# Patient Record
Sex: MALE | Race: WHITE | Employment: FULL TIME | URBAN - NONMETROPOLITAN AREA
[De-identification: names, ages, dates, MRNs, and addresses within clinical notes are randomized per-mention and may not be internally consistent; named-entity substitution may affect disease eponyms.]

---

## 2019-07-08 ENCOUNTER — HOSPITAL ENCOUNTER (OUTPATIENT)
Age: 54
Discharge: HOME | End: 2019-07-08
Payer: COMMERCIAL

## 2019-07-08 DIAGNOSIS — Z11.4 SCREENING FOR HIV (HUMAN IMMUNODEFICIENCY VIRUS): ICD-10-CM

## 2019-07-08 DIAGNOSIS — R79.89 LOW TESTOSTERONE: ICD-10-CM

## 2019-07-08 DIAGNOSIS — E11.9 CONTROLLED TYPE 2 DIABETES MELLITUS WITHOUT COMPLICATION, WITHOUT LONG-TERM CURRENT USE OF INSULIN (HCC): ICD-10-CM

## 2019-07-08 DIAGNOSIS — E55.9 VITAMIN D DEFICIENCY: ICD-10-CM

## 2019-07-08 DIAGNOSIS — Z12.5 SCREENING PSA (PROSTATE SPECIFIC ANTIGEN): ICD-10-CM

## 2019-07-08 DIAGNOSIS — E78.2 MIXED HYPERLIPIDEMIA: ICD-10-CM

## 2019-07-08 LAB
ABSOLUTE EOS #: 0.08 K/UL (ref 0–0.44)
ABSOLUTE IMMATURE GRANULOCYTE: 0.03 K/UL (ref 0–0.3)
ABSOLUTE LYMPH #: 1.62 K/UL (ref 1.1–3.7)
ABSOLUTE MONO #: 0.41 K/UL (ref 0.1–1.2)
ALBUMIN SERPL-MCNC: 4.8 G/DL (ref 3.5–5.2)
ALBUMIN/GLOBULIN RATIO: 1.5 (ref 1–2.5)
ALP BLD-CCNC: 63 U/L (ref 40–129)
ALT SERPL-CCNC: 58 U/L (ref 5–41)
ANION GAP SERPL CALCULATED.3IONS-SCNC: 13 MMOL/L (ref 9–17)
AST SERPL-CCNC: 35 U/L
BASOPHILS # BLD: 1 % (ref 0–2)
BASOPHILS ABSOLUTE: 0.05 K/UL (ref 0–0.2)
BILIRUB SERPL-MCNC: 0.42 MG/DL (ref 0.3–1.2)
BUN BLDV-MCNC: 21 MG/DL (ref 6–20)
BUN/CREAT BLD: 20 (ref 9–20)
CALCIUM SERPL-MCNC: 9.9 MG/DL (ref 8.6–10.4)
CHLORIDE BLD-SCNC: 103 MMOL/L (ref 98–107)
CHOLESTEROL/HDL RATIO: 6.8
CHOLESTEROL: 226 MG/DL
CO2: 25 MMOL/L (ref 20–31)
CREAT SERPL-MCNC: 1.06 MG/DL (ref 0.7–1.2)
CREATININE URINE: 130.2 MG/DL (ref 39–259)
DIFFERENTIAL TYPE: ABNORMAL
EOSINOPHILS RELATIVE PERCENT: 1 % (ref 1–4)
ESTIMATED AVERAGE GLUCOSE: 117 MG/DL
GFR AFRICAN AMERICAN: >60 ML/MIN
GFR NON-AFRICAN AMERICAN: >60 ML/MIN
GFR SERPL CREATININE-BSD FRML MDRD: ABNORMAL ML/MIN/{1.73_M2}
GFR SERPL CREATININE-BSD FRML MDRD: ABNORMAL ML/MIN/{1.73_M2}
GLUCOSE BLD-MCNC: 131 MG/DL (ref 70–99)
HBA1C MFR BLD: 5.7 % (ref 4.8–5.9)
HCT VFR BLD CALC: 45.5 % (ref 40.7–50.3)
HDLC SERPL-MCNC: 33 MG/DL
HEMOGLOBIN: 14.7 G/DL (ref 13–17)
HIV AG/AB: NONREACTIVE
IMMATURE GRANULOCYTES: 1 %
LDL CHOLESTEROL: 159 MG/DL (ref 0–130)
LYMPHOCYTES # BLD: 28 % (ref 24–43)
MCH RBC QN AUTO: 28.8 PG (ref 25.2–33.5)
MCHC RBC AUTO-ENTMCNC: 32.3 G/DL (ref 28.4–34.8)
MCV RBC AUTO: 89 FL (ref 82.6–102.9)
MICROALBUMIN/CREAT 24H UR: 36 MG/L
MICROALBUMIN/CREAT UR-RTO: 28 MCG/MG CREAT
MONOCYTES # BLD: 7 % (ref 3–12)
NRBC AUTOMATED: 0 PER 100 WBC
PDW BLD-RTO: 13.2 % (ref 11.8–14.4)
PLATELET # BLD: 182 K/UL (ref 138–453)
PLATELET ESTIMATE: ABNORMAL
PMV BLD AUTO: 10.7 FL (ref 8.1–13.5)
POTASSIUM SERPL-SCNC: 4.6 MMOL/L (ref 3.7–5.3)
PROSTATE SPECIFIC ANTIGEN: 7.28 UG/L
RBC # BLD: 5.11 M/UL (ref 4.21–5.77)
RBC # BLD: ABNORMAL 10*6/UL
SEG NEUTROPHILS: 62 % (ref 36–65)
SEGMENTED NEUTROPHILS ABSOLUTE COUNT: 3.63 K/UL (ref 1.5–8.1)
SEX HORMONE BINDING GLOBULIN: 30 NMOL/L (ref 11–80)
SODIUM BLD-SCNC: 141 MMOL/L (ref 135–144)
TESTOSTERONE FREE-NONMALE: 61.2 PG/ML (ref 47–244)
TESTOSTERONE TOTAL: 295 NG/DL (ref 220–1000)
TOTAL PROTEIN: 7.9 G/DL (ref 6.4–8.3)
TRIGL SERPL-MCNC: 168 MG/DL
VITAMIN D 25-HYDROXY: 56.2 NG/ML (ref 30–100)
VLDLC SERPL CALC-MCNC: ABNORMAL MG/DL (ref 1–30)
WBC # BLD: 5.8 K/UL (ref 3.5–11.3)
WBC # BLD: ABNORMAL 10*3/UL

## 2019-07-08 PROCEDURE — 80053 COMPREHEN METABOLIC PANEL: CPT

## 2019-07-08 PROCEDURE — 83036 HEMOGLOBIN GLYCOSYLATED A1C: CPT

## 2019-07-08 PROCEDURE — 87389 HIV-1 AG W/HIV-1&-2 AB AG IA: CPT

## 2019-07-08 PROCEDURE — 84270 ASSAY OF SEX HORMONE GLOBUL: CPT

## 2019-07-08 PROCEDURE — 85025 COMPLETE CBC W/AUTO DIFF WBC: CPT

## 2019-07-08 PROCEDURE — 84403 ASSAY OF TOTAL TESTOSTERONE: CPT

## 2019-07-08 PROCEDURE — 80061 LIPID PANEL: CPT

## 2019-07-08 PROCEDURE — 82043 UR ALBUMIN QUANTITATIVE: CPT

## 2019-07-08 PROCEDURE — 36415 COLL VENOUS BLD VENIPUNCTURE: CPT

## 2019-07-08 PROCEDURE — 82306 VITAMIN D 25 HYDROXY: CPT

## 2019-07-08 PROCEDURE — 82570 ASSAY OF URINE CREATININE: CPT

## 2019-07-08 PROCEDURE — G0103 PSA SCREENING: HCPCS

## 2019-07-15 PROBLEM — E11.9 DIABETES (HCC): Chronic | Status: ACTIVE | Noted: 2018-01-01

## 2019-07-15 PROBLEM — E11.9 DIABETES (HCC): Status: ACTIVE | Noted: 2018-01-01

## 2019-07-17 PROBLEM — Z12.11 COLON CANCER SCREENING: Status: ACTIVE | Noted: 2019-07-17

## 2019-08-16 PROBLEM — Z12.11 COLON CANCER SCREENING: Status: RESOLVED | Noted: 2019-07-17 | Resolved: 2019-08-16

## 2019-09-04 ENCOUNTER — ANESTHESIA (OUTPATIENT)
Dept: OPERATING ROOM | Age: 54
End: 2019-09-04
Payer: COMMERCIAL

## 2019-09-04 ENCOUNTER — HOSPITAL ENCOUNTER (OUTPATIENT)
Age: 54
Setting detail: OUTPATIENT SURGERY
Discharge: HOME | End: 2019-09-04
Attending: INTERNAL MEDICINE | Admitting: INTERNAL MEDICINE
Payer: COMMERCIAL

## 2019-09-04 ENCOUNTER — ANESTHESIA EVENT (OUTPATIENT)
Dept: OPERATING ROOM | Age: 54
End: 2019-09-04
Payer: COMMERCIAL

## 2019-09-04 VITALS
WEIGHT: 182 LBS | RESPIRATION RATE: 18 BRPM | DIASTOLIC BLOOD PRESSURE: 88 MMHG | SYSTOLIC BLOOD PRESSURE: 123 MMHG | BODY MASS INDEX: 31.07 KG/M2 | HEART RATE: 71 BPM | TEMPERATURE: 97.2 F | HEIGHT: 64 IN | OXYGEN SATURATION: 97 %

## 2019-09-04 VITALS
RESPIRATION RATE: 15 BRPM | DIASTOLIC BLOOD PRESSURE: 77 MMHG | SYSTOLIC BLOOD PRESSURE: 120 MMHG | OXYGEN SATURATION: 97 %

## 2019-09-04 PROCEDURE — 3700000000 HC ANESTHESIA ATTENDED CARE: Performed by: INTERNAL MEDICINE

## 2019-09-04 PROCEDURE — 2500000003 HC RX 250 WO HCPCS: Performed by: NURSE ANESTHETIST, CERTIFIED REGISTERED

## 2019-09-04 PROCEDURE — 3700000001 HC ADD 15 MINUTES (ANESTHESIA): Performed by: INTERNAL MEDICINE

## 2019-09-04 PROCEDURE — 6360000002 HC RX W HCPCS: Performed by: NURSE ANESTHETIST, CERTIFIED REGISTERED

## 2019-09-04 PROCEDURE — 3609027000 HC COLONOSCOPY: Performed by: INTERNAL MEDICINE

## 2019-09-04 PROCEDURE — 2709999900 HC NON-CHARGEABLE SUPPLY: Performed by: INTERNAL MEDICINE

## 2019-09-04 PROCEDURE — 7100000011 HC PHASE II RECOVERY - ADDTL 15 MIN: Performed by: INTERNAL MEDICINE

## 2019-09-04 PROCEDURE — 7100000010 HC PHASE II RECOVERY - FIRST 15 MIN: Performed by: INTERNAL MEDICINE

## 2019-09-04 PROCEDURE — 2580000003 HC RX 258: Performed by: INTERNAL MEDICINE

## 2019-09-04 PROCEDURE — 45378 DIAGNOSTIC COLONOSCOPY: CPT | Performed by: INTERNAL MEDICINE

## 2019-09-04 RX ORDER — SODIUM CHLORIDE, SODIUM LACTATE, POTASSIUM CHLORIDE, CALCIUM CHLORIDE 600; 310; 30; 20 MG/100ML; MG/100ML; MG/100ML; MG/100ML
INJECTION, SOLUTION INTRAVENOUS CONTINUOUS
Status: DISCONTINUED | OUTPATIENT
Start: 2019-09-04 | End: 2019-09-04 | Stop reason: HOSPADM

## 2019-09-04 RX ORDER — PROPOFOL 10 MG/ML
INJECTION, EMULSION INTRAVENOUS CONTINUOUS PRN
Status: DISCONTINUED | OUTPATIENT
Start: 2019-09-04 | End: 2019-09-04 | Stop reason: SDUPTHER

## 2019-09-04 RX ORDER — LIDOCAINE HYDROCHLORIDE 20 MG/ML
INJECTION, SOLUTION EPIDURAL; INFILTRATION; INTRACAUDAL; PERINEURAL PRN
Status: DISCONTINUED | OUTPATIENT
Start: 2019-09-04 | End: 2019-09-04 | Stop reason: SDUPTHER

## 2019-09-04 RX ORDER — PROPOFOL 10 MG/ML
INJECTION, EMULSION INTRAVENOUS PRN
Status: DISCONTINUED | OUTPATIENT
Start: 2019-09-04 | End: 2019-09-04 | Stop reason: SDUPTHER

## 2019-09-04 RX ADMIN — PROPOFOL 50 MG: 10 INJECTION, EMULSION INTRAVENOUS at 09:52

## 2019-09-04 RX ADMIN — SODIUM CHLORIDE, POTASSIUM CHLORIDE, SODIUM LACTATE AND CALCIUM CHLORIDE: 600; 310; 30; 20 INJECTION, SOLUTION INTRAVENOUS at 10:01

## 2019-09-04 RX ADMIN — LIDOCAINE HYDROCHLORIDE 50 MG: 20 INJECTION, SOLUTION EPIDURAL; INFILTRATION; INTRACAUDAL at 09:52

## 2019-09-04 RX ADMIN — SODIUM CHLORIDE, POTASSIUM CHLORIDE, SODIUM LACTATE AND CALCIUM CHLORIDE: 600; 310; 30; 20 INJECTION, SOLUTION INTRAVENOUS at 09:12

## 2019-09-04 RX ADMIN — PROPOFOL 200 MCG/KG/MIN: 10 INJECTION, EMULSION INTRAVENOUS at 09:52

## 2019-09-04 ASSESSMENT — PAIN SCALES - GENERAL
PAINLEVEL_OUTOF10: 0

## 2019-09-04 NOTE — PROGRESS NOTES
Discharge Criteria    Inpatients must meet Criteria 1 through 7. All other patients are either YES or N/A. If a NO is chosen then Anesthesia or Surgeon must be notified. 1.  Minimum 30 minutes after last dose of sedative medication, minimum 120 minutes after last dose of reversal agent. Yes      2. Systolic BP stable within 20 mmHg for 30 minutes & systolic BP between 90 & 959 or within 10 mmHg of baseline. Yes      3. Pulse between 60 and 100 or within 10 bpm of baseline. Yes      4. Spontaneous respiratory rate >/= 10 per minute. Yes      5. SaO2 >/= 95 or  >/= baseline. Yes      6. Able to cough and swallow or return to baseline function. Yes      7. Alert and oriented or return to baseline mental status. Yes      8. Demonstrates controlled, coordinated movements, ambulates with steady gait, or return to baseline activity function. Yes      9. Minimal or no pain or nausea, or at a level tolerable and acceptable to patient. Yes      10. Takes and retains oral fluids as allowed. Yes      11. Procedural / perioperative site stable. Minimal or no bleeding. Yes          12. If GI endoscopy procedure, minimal or no abdominal distention or passing flatus. Yes      13. Written discharge instructions and emergency telephone number provided. Yes      14. Accompanied by a responsible adult. Yes      Adult patient discharged from facility without responsible person meets above criteria plus the following:   a) remains awake without stimulus for 30 minutes     b) oriented appropriate for age      c) all vital signs stable   d) no significant risk of losing protective reflexes      e) able to maintain pre-procedure mobility without assistance   f) no nausea or dizziness      g) transportation arrangements that do not require patient to operate motor Vehicle.      N/A

## 2019-09-29 PROBLEM — Z12.11 COLON CANCER SCREENING: Status: RESOLVED | Noted: 2019-07-17 | Resolved: 2019-09-29

## 2020-02-18 ENCOUNTER — HOSPITAL ENCOUNTER (OUTPATIENT)
Age: 55
Discharge: HOME | End: 2020-02-18
Payer: COMMERCIAL

## 2020-02-18 LAB
ALT SERPL-CCNC: 74 U/L (ref 5–41)
ANION GAP SERPL CALCULATED.3IONS-SCNC: 13 MMOL/L (ref 9–17)
AST SERPL-CCNC: 41 U/L
BUN BLDV-MCNC: 15 MG/DL (ref 6–20)
BUN/CREAT BLD: 17 (ref 9–20)
CALCIUM SERPL-MCNC: 9.6 MG/DL (ref 8.6–10.4)
CHLORIDE BLD-SCNC: 99 MMOL/L (ref 98–107)
CHOLESTEROL/HDL RATIO: 8
CHOLESTEROL: 239 MG/DL
CO2: 26 MMOL/L (ref 20–31)
CREAT SERPL-MCNC: 0.89 MG/DL (ref 0.7–1.2)
CREATININE URINE: 111.9 MG/DL (ref 39–259)
ESTIMATED AVERAGE GLUCOSE: 117 MG/DL
GFR AFRICAN AMERICAN: >60 ML/MIN
GFR NON-AFRICAN AMERICAN: >60 ML/MIN
GFR SERPL CREATININE-BSD FRML MDRD: ABNORMAL ML/MIN/{1.73_M2}
GFR SERPL CREATININE-BSD FRML MDRD: ABNORMAL ML/MIN/{1.73_M2}
GLUCOSE BLD-MCNC: 135 MG/DL (ref 70–99)
HBA1C MFR BLD: 5.7 % (ref 4.8–5.9)
HDLC SERPL-MCNC: 30 MG/DL
LDL CHOLESTEROL DIRECT: 100 MG/DL
LDL CHOLESTEROL: ABNORMAL MG/DL (ref 0–130)
MICROALBUMIN/CREAT 24H UR: <12 MG/L
MICROALBUMIN/CREAT UR-RTO: NORMAL MCG/MG CREAT
POTASSIUM SERPL-SCNC: 4.2 MMOL/L (ref 3.7–5.3)
SODIUM BLD-SCNC: 138 MMOL/L (ref 135–144)
TRIGL SERPL-MCNC: 754 MG/DL
VLDLC SERPL CALC-MCNC: ABNORMAL MG/DL (ref 1–30)

## 2020-02-18 PROCEDURE — 82043 UR ALBUMIN QUANTITATIVE: CPT

## 2020-02-18 PROCEDURE — 82570 ASSAY OF URINE CREATININE: CPT

## 2020-02-18 PROCEDURE — 84450 TRANSFERASE (AST) (SGOT): CPT

## 2020-02-18 PROCEDURE — 83036 HEMOGLOBIN GLYCOSYLATED A1C: CPT

## 2020-02-18 PROCEDURE — 36415 COLL VENOUS BLD VENIPUNCTURE: CPT

## 2020-02-18 PROCEDURE — 83721 ASSAY OF BLOOD LIPOPROTEIN: CPT

## 2020-02-18 PROCEDURE — 80048 BASIC METABOLIC PNL TOTAL CA: CPT

## 2020-02-18 PROCEDURE — 84460 ALANINE AMINO (ALT) (SGPT): CPT

## 2020-02-18 PROCEDURE — 80061 LIPID PANEL: CPT

## 2021-03-14 ENCOUNTER — HOSPITAL ENCOUNTER (EMERGENCY)
Age: 56
Discharge: HOME | DRG: 177 | End: 2021-03-14
Attending: EMERGENCY MEDICINE
Payer: COMMERCIAL

## 2021-03-14 ENCOUNTER — APPOINTMENT (OUTPATIENT)
Dept: GENERAL RADIOLOGY | Age: 56
DRG: 177 | End: 2021-03-14
Payer: COMMERCIAL

## 2021-03-14 VITALS
BODY MASS INDEX: 32.61 KG/M2 | SYSTOLIC BLOOD PRESSURE: 130 MMHG | HEART RATE: 104 BPM | RESPIRATION RATE: 16 BRPM | WEIGHT: 190 LBS | DIASTOLIC BLOOD PRESSURE: 82 MMHG | OXYGEN SATURATION: 93 % | TEMPERATURE: 96.5 F

## 2021-03-14 DIAGNOSIS — J18.9 PNEUMONIA DUE TO ORGANISM: Primary | ICD-10-CM

## 2021-03-14 LAB
ABSOLUTE EOS #: <0.03 K/UL (ref 0–0.44)
ABSOLUTE IMMATURE GRANULOCYTE: <0.03 K/UL (ref 0–0.3)
ABSOLUTE LYMPH #: 0.94 K/UL (ref 1.1–3.7)
ABSOLUTE MONO #: 0.42 K/UL (ref 0.1–1.2)
ANION GAP SERPL CALCULATED.3IONS-SCNC: 13 MMOL/L (ref 9–17)
BASOPHILS # BLD: 0 % (ref 0–2)
BASOPHILS ABSOLUTE: <0.03 K/UL (ref 0–0.2)
BNP INTERPRETATION: NORMAL
BUN BLDV-MCNC: 21 MG/DL (ref 6–20)
BUN/CREAT BLD: 21 (ref 9–20)
CALCIUM SERPL-MCNC: 9.1 MG/DL (ref 8.6–10.4)
CHLORIDE BLD-SCNC: 98 MMOL/L (ref 98–107)
CO2: 25 MMOL/L (ref 20–31)
CREAT SERPL-MCNC: 1.01 MG/DL (ref 0.7–1.2)
DIFFERENTIAL TYPE: ABNORMAL
EOSINOPHILS RELATIVE PERCENT: 0 % (ref 1–4)
GFR AFRICAN AMERICAN: >60 ML/MIN
GFR NON-AFRICAN AMERICAN: >60 ML/MIN
GFR SERPL CREATININE-BSD FRML MDRD: ABNORMAL ML/MIN/{1.73_M2}
GFR SERPL CREATININE-BSD FRML MDRD: ABNORMAL ML/MIN/{1.73_M2}
GLUCOSE BLD-MCNC: 174 MG/DL (ref 70–99)
HCT VFR BLD CALC: 43.8 % (ref 40.7–50.3)
HEMOGLOBIN: 14.4 G/DL (ref 13–17)
IMMATURE GRANULOCYTES: 0 %
LYMPHOCYTES # BLD: 19 % (ref 24–43)
MAGNESIUM: 2 MG/DL (ref 1.6–2.6)
MCH RBC QN AUTO: 27.7 PG (ref 25.2–33.5)
MCHC RBC AUTO-ENTMCNC: 32.9 G/DL (ref 28.4–34.8)
MCV RBC AUTO: 84.4 FL (ref 82.6–102.9)
MONOCYTES # BLD: 8 % (ref 3–12)
NRBC AUTOMATED: 0 PER 100 WBC
PDW BLD-RTO: 13.2 % (ref 11.8–14.4)
PLATELET # BLD: ABNORMAL K/UL (ref 138–453)
PLATELET ESTIMATE: ABNORMAL
PLATELET, FLUORESCENCE: 105 K/UL (ref 138–453)
PLATELET, IMMATURE FRACTION: 3.7 % (ref 1.1–10.3)
PMV BLD AUTO: ABNORMAL FL (ref 8.1–13.5)
POTASSIUM SERPL-SCNC: 4.1 MMOL/L (ref 3.7–5.3)
PRO-BNP: <20 PG/ML
RBC # BLD: 5.19 M/UL (ref 4.21–5.77)
RBC # BLD: ABNORMAL 10*6/UL
SEG NEUTROPHILS: 73 % (ref 36–65)
SEGMENTED NEUTROPHILS ABSOLUTE COUNT: 3.7 K/UL (ref 1.5–8.1)
SODIUM BLD-SCNC: 136 MMOL/L (ref 135–144)
TROPONIN INTERP: NORMAL
TROPONIN T: NORMAL NG/ML
TROPONIN, HIGH SENSITIVITY: 6 NG/L (ref 0–22)
WBC # BLD: 5.1 K/UL (ref 3.5–11.3)
WBC # BLD: ABNORMAL 10*3/UL

## 2021-03-14 PROCEDURE — 83735 ASSAY OF MAGNESIUM: CPT

## 2021-03-14 PROCEDURE — 6360000002 HC RX W HCPCS: Performed by: EMERGENCY MEDICINE

## 2021-03-14 PROCEDURE — 84484 ASSAY OF TROPONIN QUANT: CPT

## 2021-03-14 PROCEDURE — 80048 BASIC METABOLIC PNL TOTAL CA: CPT

## 2021-03-14 PROCEDURE — 83880 ASSAY OF NATRIURETIC PEPTIDE: CPT

## 2021-03-14 PROCEDURE — 96374 THER/PROPH/DIAG INJ IV PUSH: CPT

## 2021-03-14 PROCEDURE — 85055 RETICULATED PLATELET ASSAY: CPT

## 2021-03-14 PROCEDURE — 36415 COLL VENOUS BLD VENIPUNCTURE: CPT

## 2021-03-14 PROCEDURE — 71046 X-RAY EXAM CHEST 2 VIEWS: CPT

## 2021-03-14 PROCEDURE — 96375 TX/PRO/DX INJ NEW DRUG ADDON: CPT

## 2021-03-14 PROCEDURE — 6370000000 HC RX 637 (ALT 250 FOR IP): Performed by: EMERGENCY MEDICINE

## 2021-03-14 PROCEDURE — 99283 EMERGENCY DEPT VISIT LOW MDM: CPT

## 2021-03-14 PROCEDURE — 2580000003 HC RX 258: Performed by: EMERGENCY MEDICINE

## 2021-03-14 PROCEDURE — 85025 COMPLETE CBC W/AUTO DIFF WBC: CPT

## 2021-03-14 PROCEDURE — 93005 ELECTROCARDIOGRAM TRACING: CPT | Performed by: EMERGENCY MEDICINE

## 2021-03-14 RX ORDER — DEXAMETHASONE SODIUM PHOSPHATE 10 MG/ML
10 INJECTION INTRAMUSCULAR; INTRAVENOUS ONCE
Status: COMPLETED | OUTPATIENT
Start: 2021-03-14 | End: 2021-03-14

## 2021-03-14 RX ORDER — DOXYCYCLINE HYCLATE 100 MG/1
100 CAPSULE ORAL ONCE
Status: COMPLETED | OUTPATIENT
Start: 2021-03-14 | End: 2021-03-14

## 2021-03-14 RX ORDER — 0.9 % SODIUM CHLORIDE 0.9 %
1000 INTRAVENOUS SOLUTION INTRAVENOUS ONCE
Status: COMPLETED | OUTPATIENT
Start: 2021-03-14 | End: 2021-03-14

## 2021-03-14 RX ORDER — DOXYCYCLINE HYCLATE 100 MG
100 TABLET ORAL 2 TIMES DAILY
Qty: 20 TABLET | Refills: 0 | Status: SHIPPED | OUTPATIENT
Start: 2021-03-14 | End: 2021-03-24

## 2021-03-14 RX ORDER — ALBUTEROL SULFATE 90 UG/1
AEROSOL, METERED RESPIRATORY (INHALATION)
Qty: 1 INHALER | Refills: 0 | Status: SHIPPED | OUTPATIENT
Start: 2021-03-14

## 2021-03-14 RX ADMIN — DEXAMETHASONE SODIUM PHOSPHATE 10 MG: 10 INJECTION INTRAMUSCULAR; INTRAVENOUS at 07:04

## 2021-03-14 RX ADMIN — DOXYCYCLINE HYCLATE 100 MG: 100 CAPSULE ORAL at 07:51

## 2021-03-14 RX ADMIN — SODIUM CHLORIDE 1000 ML: 9 INJECTION, SOLUTION INTRAVENOUS at 07:04

## 2021-03-14 ASSESSMENT — ENCOUNTER SYMPTOMS
ABDOMINAL PAIN: 0
SORE THROAT: 0
VOMITING: 0
COUGH: 1
SHORTNESS OF BREATH: 1
COLOR CHANGE: 0
NAUSEA: 1

## 2021-03-14 NOTE — ED PROVIDER NOTES
[facility] ED  eMERGENCY dEPARTMENT eNCOUnter      Pt Name: Phillip Cabrera  MRN: [id number]  [place] 1965  Date of evaluation: 3/14/2021  Provider: Jennifer Harrington[address]       Chief Complaint   Patient presents with    Concern For COVID-19     cough, fever, chills, ongoing for 1 week. Has had negative Covid test x2  last week. HISTORY OF PRESENT ILLNESS   (Location/Symptom, Timing/Onset, Context/Setting, Quality, Duration, Modifying Factors, Severity) Note limiting factors. HPI    Phillip Cabrera is a 50 y.o. male who presents to the emergency department with complaints of cough and fatigue. Patient states for the past 7 to 10 days he has had subjective fevers and chills with cough and decreased appetite and fatigue. He reports he had 2 negative Covid test last week at an urgent care. He states that he has no history of lung disorder. He reports that despite the negative test and giving his symptoms time to improve that he does not feel any better and secondary to this presents for evaluation. Nursing Notes were reviewed. REVIEW OF SYSTEMS    (2+ for level 4; 10+ for level 5)   Review of Systems   Constitutional: Positive for chills, fatigue and fever. HENT: Negative for congestion and sore throat. Respiratory: Positive for cough and shortness of breath. Cardiovascular: Negative for chest pain and leg swelling. Gastrointestinal: Positive for nausea. Negative for abdominal pain and vomiting. Musculoskeletal: Negative for myalgias. Skin: Negative for color change and rash. Neurological: Negative for dizziness, light-headedness and headaches. All other systems reviewed and are negative.       PAST MEDICAL HISTORY     Past Medical History:   Diagnosis Date    Asperger's disorder     Depression     Diabetes ([facility] [address] 2018    Hyperlipidemia     Vitamin D deficiency        SURGICAL HISTORY       Past Surgical History:   Procedure Laterality Date    COLECTOMY  2013    partial sigmoid colectomy due to diverticulitis    COLONOSCOPY  09/04/2019    Dr. Autumn Guardado of prior surg end to end anastomosis-sigmoid colon    COLONOSCOPY N/A 9/4/2019    COLORECTAL CANCER SCREENING, NOT HIGH RISK performed by Divina Corrigan MD at [address] [place] [address] [address]   [address]  2013       CURRENT MEDICATIONS       Previous Medications    ATORVASTATIN (LIPITOR) 40 MG TABLET    Take 1 tablet by mouth daily    BUPROPION (WELLBUTRIN XL) 150 MG EXTENDED RELEASE TABLET    Take 1 tablet by mouth every morning    METFORMIN (GLUCOPHAGE) 1000 MG TABLET    Take 1 tablet by mouth 2 times daily (with meals)       ALLERGIES     Patient has no known allergies.     FAMILY HISTORY       Family History   Problem Relation Age of Onset    Hypertension Mother     High Cholesterol Mother     Neuropathy Father         Shy-Drager syndrome        SOCIAL HISTORY       Social History     Socioeconomic History    Marital status:      Spouse name: None    Number of children: None    Years of education: None    Highest education level: None   Occupational History    None   Social Needs    Financial resource strain: Somewhat hard    Food insecurity     Worry: Never true     Inability: Never true    Transportation needs     Medical: No     Non-medical: No   Tobacco Use    Smoking status: Never Smoker    Smokeless tobacco: Never Used   Substance and Sexual Activity    Alcohol use: Not Currently    Drug use: Never    Sexual activity: None   Lifestyle    Physical activity     Days per week: None     Minutes per session: None    Stress: None   Relationships    Social connections     Talks on phone: None     Gets together: None     Attends Mandaen service: None     Active member of club or organization: None     Attends meetings of clubs or organizations: None     Relationship status: None    Intimate partner violence     Fear of current or ex partner: None     Emotionally abused: None     Physically abused: None     Forced sexual activity: None   Other Topics Concern    None   Social History Narrative    None       SCREENINGS           PHYSICAL EXAM    (up to 7 for level 4, 8 or more for level 5)   @[email or web address]    Physical Exam  Vitals signs and nursing note reviewed. Constitutional:       General: He is not in acute distress. Appearance: Normal appearance. He is not ill-appearing, toxic-appearing or diaphoretic. HENT:      Head: Normocephalic and atraumatic. Nose: Nose normal. No congestion or rhinorrhea. Mouth/Throat:      Mouth: Mucous membranes are moist.      Pharynx: Oropharynx is clear. No oropharyngeal exudate or posterior oropharyngeal erythema. Comments: No tongue or lip swelling no oral lesions no airway edema or compromise  Eyes:      General: No scleral icterus. Right eye: No discharge. Left eye: No discharge. Extraocular Movements: Extraocular movements intact. Conjunctiva/sclera: Conjunctivae normal.      Pupils: Pupils are equal, round, and reactive to light. Neck:      Musculoskeletal: Normal range of motion and neck supple. No neck rigidity or muscular tenderness. Comments: No JVD  Cardiovascular:      Rate and Rhythm: Normal rate and regular rhythm. Pulses: Normal pulses. Heart sounds: Normal heart sounds. No murmur. No friction rub. No gallop. Pulmonary:      Comments: Breath sounds are diminished throughout with faint wheeze in the bilateral bases but no nasal flaring retractions tachypnea or accessory muscle use  Abdominal:      General: Abdomen is flat. There is no distension. Palpations: Abdomen is soft. Tenderness: There is no abdominal tenderness. There is no guarding. Hernia: No hernia is present. Comments: Bowel sounds are hyperactive no voluntary guarding or rigidity no pulsatile mass   Musculoskeletal: Normal range of motion. General: No swelling, tenderness, deformity or signs of injury. Comments: No asymmetric edema no pitting edema negative Homans' sign bilaterally   Skin:     General: Skin is warm and dry. Capillary Refill: Capillary refill takes less than 2 seconds. Findings: No erythema or rash. Neurological:      General: No focal deficit present. Mental Status: He is alert and oriented to person, place, and time. Cranial Nerves: No cranial nerve deficit. Psychiatric:      Comments: Patient has a flat affect         DIAGNOSTIC RESULTS     EKG (Per Emergency Physician):   EKG shows normal sinus rhythm at a rate of 98 with nonspecific ST segment changes but no acute current of injury or dysrhythmia change. QTC is 428 and WV interval normal at 146    RADIOLOGY (Per Emergency Physician): Interpretation per the Radiologist below, if available at the time of this note:  Xr Chest (2 Vw)    Result Date: 3/14/2021  EXAMINATION: TWO XRAY VIEWS OF THE CHEST 3/14/2021 7:02 am COMPARISON: None. HISTORY: For 7-10 days, patient has had cough and fatigue along with fever/chills. FINDINGS: Heart is not enlarged. No pleural effusion or pneumothorax. Right lung is clear. Focal airspace disease in the left perihilar region. Left upper lobe opacity is suspicious for pneumonia.        ED BEDSIDE ULTRASOUND:   Performed by ED Physician - none    LABS:  Labs Reviewed   CBC WITH AUTO DIFFERENTIAL - Abnormal; Notable for the following components:       Result Value    Seg Neutrophils 73 (*)     Lymphocytes 19 (*)     Eosinophils % 0 (*)     Absolute Lymph # 0.94 (*)     All other components within normal limits   BASIC METABOLIC PANEL W/ REFLEX TO MG FOR LOW K - Abnormal; Notable for the following components:    Glucose 174 (*)     BUN 21 (*)     Bun/Cre Ratio 21 (*)     All other components within normal limits   IMMATURE PLATELET FRACTION - Abnormal; Notable for the following components:    Platelet, Fluorescence 105 (*)     All other components within normal limits   TROPONIN   MAGNESIUM   BRAIN NATRIURETIC PEPTIDE        All other labs were within normal range or not returned as of this dictation. EMERGENCY DEPARTMENT COURSE and DIFFERENTIAL DIAGNOSIS/MDM:   Vitals:    Vitals:    03/14/21 0558 03/14/21 0708   BP: 135/89 138/86   Pulse: 109 104   Resp: 16 16   Temp: 96.5 °F (35.8 °C)    TempSrc: Temporal    SpO2: 96% 93%   Weight: 190 lb (86.2 kg)        Medications   0.9 % sodium chloride bolus (1,000 mLs Intravenous New Bag 3/14/21 0704)   doxycycline hyclate (VIBRAMYCIN) capsule 100 mg (has no administration in time range)   dexamethasone (DECADRON) injection 10 mg (10 mg Intravenous Given 3/14/21 0704)       MDM. Patient presented to the ER afebrile and satting in the mid 90s on room air in no acute respiratory distress. He reports symptoms consistent with a viral illness such as subjective fevers chills cough and fatigue but has tested negative for Covid twice in the past week. Therefore this time a basic work-up will be obtained to check for electrolytes and cardiac changes or possible infectious process. Patient's work-up revealed no clinically significant laboratory changes but did show a early infiltrate on x-ray. On reevaluation he is resting comfortably and is in no acute respiratory distress therefore we placed on antibiotics and discharged home. REVAL:         CRITICAL CARE TIME   Total Critical Care time was minutes, excluding separately reportable procedures. There was a high probability of clinically significant/life threatening deterioration in the patient's condition which required my urgent intervention. CONSULTS:  None    PROCEDURES:  Unless otherwise noted below, none     Procedures    FINAL IMPRESSION      1.  Pneumonia due to organism          DISPOSITION/PLAN   DISPOSITION        PATIENT REFERRED TO:  Lynnette Bey MD  [address] [facility] [address]  [phone]    Schedule an appointment as soon as possible for a visit in 1 week  If symptoms worsen    Denman Leyden, MD  [facility]    Schedule an appointment as soon as possible for a visit in 5 days  If symptoms worsen      DISCHARGE MEDICATIONS:  New Prescriptions    ALBUTEROL SULFATE HFA (VENTOLIN HFA) 108 (90 BASE) MCG/ACT INHALER    1 to 2 puffs every 4 to 6 hours as needed shortness of breath or wheeze    DOXYCYCLINE HYCLATE (VIBRA-TABS) 100 MG TABLET    Take 1 tablet by mouth 2 times daily for 10 days          (Please note:  Portions of this note were completed with a voice recognition program.  Efforts were made to edit the dictations but occasionally words and phrases are mis-transcribed.)  Form v2016. J.5-cn    Khushbu Colunga DO (electronically signed)  Emergency Medicine Provider        Tano Frost DO  03/14/21 [address] [place] [address]  03/14/21 4466

## 2021-03-15 LAB
EKG ATRIAL RATE: 98 BPM
EKG P AXIS: 53 DEGREES
EKG P-R INTERVAL: 146 MS
EKG Q-T INTERVAL: 336 MS
EKG QRS DURATION: 84 MS
EKG QTC CALCULATION (BAZETT): 428 MS
EKG R AXIS: 22 DEGREES
EKG T AXIS: 26 DEGREES
EKG VENTRICULAR RATE: 98 BPM

## 2021-03-15 PROCEDURE — 93010 ELECTROCARDIOGRAM REPORT: CPT | Performed by: INTERNAL MEDICINE

## 2021-03-16 ENCOUNTER — HOSPITAL ENCOUNTER (OUTPATIENT)
Dept: LAB | Age: 56
Setting detail: SPECIMEN
Discharge: HOME | DRG: 177 | End: 2021-03-16
Payer: COMMERCIAL

## 2021-03-16 DIAGNOSIS — J18.9 PNEUMONIA OF LEFT UPPER LOBE DUE TO INFECTIOUS ORGANISM: ICD-10-CM

## 2021-03-16 PROCEDURE — U0005 INFEC AGEN DETEC AMPLI PROBE: HCPCS

## 2021-03-16 PROCEDURE — U0003 INFECTIOUS AGENT DETECTION BY NUCLEIC ACID (DNA OR RNA); SEVERE ACUTE RESPIRATORY SYNDROME CORONAVIRUS 2 (SARS-COV-2) (CORONAVIRUS DISEASE [COVID-19]), AMPLIFIED PROBE TECHNIQUE, MAKING USE OF HIGH THROUGHPUT TECHNOLOGIES AS DESCRIBED BY CMS-2020-01-R: HCPCS

## 2021-03-16 PROCEDURE — C9803 HOPD COVID-19 SPEC COLLECT: HCPCS

## 2021-03-17 ENCOUNTER — APPOINTMENT (OUTPATIENT)
Dept: CT IMAGING | Age: 56
DRG: 177 | End: 2021-03-17
Payer: COMMERCIAL

## 2021-03-17 ENCOUNTER — APPOINTMENT (OUTPATIENT)
Dept: GENERAL RADIOLOGY | Age: 56
DRG: 177 | End: 2021-03-17
Payer: COMMERCIAL

## 2021-03-17 ENCOUNTER — HOSPITAL ENCOUNTER (INPATIENT)
Age: 56
LOS: 1 days | Discharge: HOME | DRG: 177 | End: 2021-03-18
Attending: INTERNAL MEDICINE | Admitting: FAMILY MEDICINE
Payer: COMMERCIAL

## 2021-03-17 DIAGNOSIS — U07.1 COVID-19: Primary | ICD-10-CM

## 2021-03-17 LAB
ALLEN TEST: ABNORMAL
ANION GAP SERPL CALCULATED.3IONS-SCNC: 11 MMOL/L (ref 9–17)
BUN BLDV-MCNC: 22 MG/DL (ref 6–20)
BUN/CREAT BLD: 27 (ref 9–20)
CALCIUM SERPL-MCNC: 9.6 MG/DL (ref 8.6–10.4)
CARBOXYHEMOGLOBIN: ABNORMAL % (ref 0–5)
CHLORIDE BLD-SCNC: 101 MMOL/L (ref 98–107)
CO2: 25 MMOL/L (ref 20–31)
CREAT SERPL-MCNC: 0.82 MG/DL (ref 0.7–1.2)
FIO2: ABNORMAL
GFR AFRICAN AMERICAN: >60 ML/MIN
GFR NON-AFRICAN AMERICAN: >60 ML/MIN
GFR SERPL CREATININE-BSD FRML MDRD: ABNORMAL ML/MIN/{1.73_M2}
GFR SERPL CREATININE-BSD FRML MDRD: ABNORMAL ML/MIN/{1.73_M2}
GLUCOSE BLD-MCNC: 113 MG/DL (ref 70–99)
HCO3 VENOUS: 24.4 MMOL/L (ref 24–30)
HCT VFR BLD CALC: 40.7 % (ref 40.7–50.3)
HEMOGLOBIN: 13.7 G/DL (ref 13–17)
MCH RBC QN AUTO: 27.9 PG (ref 25.2–33.5)
MCHC RBC AUTO-ENTMCNC: 33.7 G/DL (ref 28.4–34.8)
MCV RBC AUTO: 82.9 FL (ref 82.6–102.9)
METHEMOGLOBIN: ABNORMAL % (ref 0–1.9)
MODE: ABNORMAL
NEGATIVE BASE EXCESS, VEN: ABNORMAL MMOL/L (ref 0–2)
NOTIFICATION TIME: ABNORMAL
NOTIFICATION: ABNORMAL
NRBC AUTOMATED: 0 PER 100 WBC
O2 DEVICE/FLOW/%: ABNORMAL
O2 SAT, VEN: 98.9 % (ref 60–85)
OXYHEMOGLOBIN: ABNORMAL % (ref 95–98)
PATIENT TEMP: ABNORMAL
PCO2, VEN, TEMP ADJ: ABNORMAL MMHG (ref 39–55)
PCO2, VEN: 32.8 (ref 39–55)
PDW BLD-RTO: 13 % (ref 11.8–14.4)
PEEP/CPAP: ABNORMAL
PH VENOUS: 7.49 (ref 7.32–7.42)
PH, VEN, TEMP ADJ: ABNORMAL (ref 7.32–7.42)
PLATELET # BLD: 145 K/UL (ref 138–453)
PMV BLD AUTO: 10.4 FL (ref 8.1–13.5)
PO2, VEN, TEMP ADJ: ABNORMAL MMHG (ref 30–50)
PO2, VEN: 136 (ref 30–50)
POSITIVE BASE EXCESS, VEN: 1.8 MMOL/L (ref 0–2)
POTASSIUM SERPL-SCNC: 4.2 MMOL/L (ref 3.7–5.3)
PSV: ABNORMAL
PT. POSITION: ABNORMAL
RBC # BLD: 4.91 M/UL (ref 4.21–5.77)
RESPIRATORY RATE: ABNORMAL
SAMPLE SITE: ABNORMAL
SARS-COV-2: ABNORMAL
SARS-COV-2: DETECTED
SET RATE: ABNORMAL
SODIUM BLD-SCNC: 137 MMOL/L (ref 135–144)
SOURCE: ABNORMAL
TEXT FOR RESPIRATORY: ABNORMAL
TOTAL HB: ABNORMAL G/DL (ref 12–16)
TOTAL RATE: ABNORMAL
VT: ABNORMAL
WBC # BLD: 8.7 K/UL (ref 3.5–11.3)

## 2021-03-17 PROCEDURE — 85730 THROMBOPLASTIN TIME PARTIAL: CPT

## 2021-03-17 PROCEDURE — 71045 X-RAY EXAM CHEST 1 VIEW: CPT

## 2021-03-17 PROCEDURE — 71260 CT THORAX DX C+: CPT

## 2021-03-17 PROCEDURE — 82805 BLOOD GASES W/O2 SATURATION: CPT

## 2021-03-17 PROCEDURE — 80048 BASIC METABOLIC PNL TOTAL CA: CPT

## 2021-03-17 PROCEDURE — 84460 ALANINE AMINO (ALT) (SGPT): CPT

## 2021-03-17 PROCEDURE — G0378 HOSPITAL OBSERVATION PER HR: HCPCS

## 2021-03-17 PROCEDURE — 93005 ELECTROCARDIOGRAM TRACING: CPT | Performed by: PHYSICIAN ASSISTANT

## 2021-03-17 PROCEDURE — 6370000000 HC RX 637 (ALT 250 FOR IP): Performed by: EMERGENCY MEDICINE

## 2021-03-17 PROCEDURE — 6360000004 HC RX CONTRAST MEDICATION: Performed by: PHYSICIAN ASSISTANT

## 2021-03-17 PROCEDURE — 36415 COLL VENOUS BLD VENIPUNCTURE: CPT

## 2021-03-17 PROCEDURE — 96375 TX/PRO/DX INJ NEW DRUG ADDON: CPT

## 2021-03-17 PROCEDURE — 85027 COMPLETE CBC AUTOMATED: CPT

## 2021-03-17 PROCEDURE — 1200000000 HC SEMI PRIVATE

## 2021-03-17 PROCEDURE — 99284 EMERGENCY DEPT VISIT MOD MDM: CPT

## 2021-03-17 RX ORDER — IBUPROFEN 800 MG/1
800 TABLET ORAL ONCE
Status: COMPLETED | OUTPATIENT
Start: 2021-03-17 | End: 2021-03-17

## 2021-03-17 RX ORDER — ACETAMINOPHEN 500 MG
1000 TABLET ORAL ONCE
Status: COMPLETED | OUTPATIENT
Start: 2021-03-17 | End: 2021-03-17

## 2021-03-17 RX ORDER — ACETAMINOPHEN 325 MG/1
325 TABLET ORAL EVERY 6 HOURS PRN
COMMUNITY

## 2021-03-17 RX ADMIN — ACETAMINOPHEN 1000 MG: 500 TABLET, FILM COATED ORAL at 21:33

## 2021-03-17 RX ADMIN — IOPAMIDOL 75 ML: 755 INJECTION, SOLUTION INTRAVENOUS at 23:02

## 2021-03-17 RX ADMIN — IBUPROFEN 800 MG: 800 TABLET, FILM COATED ORAL at 21:33

## 2021-03-17 ASSESSMENT — PAIN DESCRIPTION - DESCRIPTORS: DESCRIPTORS: ACHING;DULL

## 2021-03-17 ASSESSMENT — ENCOUNTER SYMPTOMS
COUGH: 1
COUGH: 0
DIARRHEA: 0
SORE THROAT: 0
CHEST TIGHTNESS: 0
RHINORRHEA: 0
WHEEZING: 0
NAUSEA: 0
SHORTNESS OF BREATH: 1
EYE DISCHARGE: 0
VOMITING: 0
EYE REDNESS: 0
BLOOD IN STOOL: 0
BACK PAIN: 0
CONSTIPATION: 0
ABDOMINAL PAIN: 0

## 2021-03-17 ASSESSMENT — PAIN DESCRIPTION - LOCATION: LOCATION: ABDOMEN

## 2021-03-17 ASSESSMENT — PAIN DESCRIPTION - PAIN TYPE: TYPE: ACUTE PAIN

## 2021-03-18 VITALS
DIASTOLIC BLOOD PRESSURE: 88 MMHG | RESPIRATION RATE: 18 BRPM | BODY MASS INDEX: 31.09 KG/M2 | HEART RATE: 80 BPM | WEIGHT: 182.1 LBS | SYSTOLIC BLOOD PRESSURE: 112 MMHG | HEIGHT: 64 IN | TEMPERATURE: 96.9 F | OXYGEN SATURATION: 96 %

## 2021-03-18 LAB
ABSOLUTE EOS #: 0 K/UL (ref 0–0.44)
ABSOLUTE IMMATURE GRANULOCYTE: 0.08 K/UL (ref 0–0.3)
ABSOLUTE LYMPH #: 0.83 K/UL (ref 1.1–3.7)
ABSOLUTE MONO #: 0.58 K/UL (ref 0.1–1.2)
ALT SERPL-CCNC: 36 U/L (ref 5–41)
ANION GAP SERPL CALCULATED.3IONS-SCNC: 10 MMOL/L (ref 9–17)
BASOPHILS # BLD: 0 % (ref 0–2)
BASOPHILS ABSOLUTE: 0 K/UL (ref 0–0.2)
BUN BLDV-MCNC: 23 MG/DL (ref 6–20)
BUN/CREAT BLD: 26 (ref 9–20)
CALCIUM SERPL-MCNC: 9.2 MG/DL (ref 8.6–10.4)
CHLORIDE BLD-SCNC: 97 MMOL/L (ref 98–107)
CO2: 26 MMOL/L (ref 20–31)
CREAT SERPL-MCNC: 0.9 MG/DL (ref 0.7–1.2)
D-DIMER QUANTITATIVE: 0.36 MG/L FEU (ref 0–0.59)
D-DIMER QUANTITATIVE: 0.4 MG/L FEU (ref 0–0.59)
DIFFERENTIAL TYPE: ABNORMAL
EKG ATRIAL RATE: 88 BPM
EKG P AXIS: 48 DEGREES
EKG P-R INTERVAL: 140 MS
EKG Q-T INTERVAL: 348 MS
EKG QRS DURATION: 84 MS
EKG QTC CALCULATION (BAZETT): 421 MS
EKG R AXIS: 5 DEGREES
EKG T AXIS: 2 DEGREES
EKG VENTRICULAR RATE: 88 BPM
EOSINOPHILS RELATIVE PERCENT: 0 % (ref 1–4)
FERRITIN: 548 UG/L (ref 30–400)
FIBRINOGEN: 337 MG/DL (ref 179–518)
GFR AFRICAN AMERICAN: >60 ML/MIN
GFR NON-AFRICAN AMERICAN: >60 ML/MIN
GFR SERPL CREATININE-BSD FRML MDRD: ABNORMAL ML/MIN/{1.73_M2}
GFR SERPL CREATININE-BSD FRML MDRD: ABNORMAL ML/MIN/{1.73_M2}
GLUCOSE BLD-MCNC: 204 MG/DL (ref 70–99)
HCT VFR BLD CALC: 40 % (ref 40.7–50.3)
HEMOGLOBIN: 13 G/DL (ref 13–17)
IMMATURE GRANULOCYTES: 1 %
INR BLD: 1
INR BLD: 1.1
LACTATE DEHYDROGENASE: 241 U/L (ref 135–225)
LYMPHOCYTES # BLD: 10 % (ref 24–43)
MCH RBC QN AUTO: 27.5 PG (ref 25.2–33.5)
MCHC RBC AUTO-ENTMCNC: 32.5 G/DL (ref 28.4–34.8)
MCV RBC AUTO: 84.7 FL (ref 82.6–102.9)
MONOCYTES # BLD: 7 % (ref 3–12)
MORPHOLOGY: ABNORMAL
NRBC AUTOMATED: 0 PER 100 WBC
PARTIAL THROMBOPLASTIN TIME: 27.2 SEC (ref 23.9–33.8)
PARTIAL THROMBOPLASTIN TIME: 34.6 SEC (ref 23.9–33.8)
PDW BLD-RTO: 13.2 % (ref 11.8–14.4)
PLATELET # BLD: ABNORMAL K/UL (ref 138–453)
PLATELET ESTIMATE: ABNORMAL
PLATELET, FLUORESCENCE: 133 K/UL (ref 138–453)
PLATELET, IMMATURE FRACTION: 3.9 % (ref 1.1–10.3)
PMV BLD AUTO: ABNORMAL FL (ref 8.1–13.5)
POTASSIUM SERPL-SCNC: 4.3 MMOL/L (ref 3.7–5.3)
PROCALCITONIN: 0.08 NG/ML
PROTHROMBIN TIME: 13.5 SEC (ref 11.5–14.2)
PROTHROMBIN TIME: 13.6 SEC (ref 11.5–14.2)
RBC # BLD: 4.72 M/UL (ref 4.21–5.77)
RBC # BLD: ABNORMAL 10*6/UL
SEG NEUTROPHILS: 82 % (ref 36–65)
SEGMENTED NEUTROPHILS ABSOLUTE COUNT: 6.81 K/UL (ref 1.5–8.1)
SODIUM BLD-SCNC: 133 MMOL/L (ref 135–144)
WBC # BLD: 8.3 K/UL (ref 3.5–11.3)
WBC # BLD: ABNORMAL 10*3/UL

## 2021-03-18 PROCEDURE — 96372 THER/PROPH/DIAG INJ SC/IM: CPT

## 2021-03-18 PROCEDURE — G0378 HOSPITAL OBSERVATION PER HR: HCPCS

## 2021-03-18 PROCEDURE — 2580000003 HC RX 258: Performed by: INTERNAL MEDICINE

## 2021-03-18 PROCEDURE — 85379 FIBRIN DEGRADATION QUANT: CPT

## 2021-03-18 PROCEDURE — 85384 FIBRINOGEN ACTIVITY: CPT

## 2021-03-18 PROCEDURE — 85055 RETICULATED PLATELET ASSAY: CPT

## 2021-03-18 PROCEDURE — 96365 THER/PROPH/DIAG IV INF INIT: CPT

## 2021-03-18 PROCEDURE — 83615 LACTATE (LD) (LDH) ENZYME: CPT

## 2021-03-18 PROCEDURE — 97161 PT EVAL LOW COMPLEX 20 MIN: CPT

## 2021-03-18 PROCEDURE — 85610 PROTHROMBIN TIME: CPT

## 2021-03-18 PROCEDURE — 6370000000 HC RX 637 (ALT 250 FOR IP): Performed by: PHYSICIAN ASSISTANT

## 2021-03-18 PROCEDURE — 96375 TX/PRO/DX INJ NEW DRUG ADDON: CPT

## 2021-03-18 PROCEDURE — 84145 PROCALCITONIN (PCT): CPT

## 2021-03-18 PROCEDURE — 97165 OT EVAL LOW COMPLEX 30 MIN: CPT

## 2021-03-18 PROCEDURE — 36415 COLL VENOUS BLD VENIPUNCTURE: CPT

## 2021-03-18 PROCEDURE — 80048 BASIC METABOLIC PNL TOTAL CA: CPT

## 2021-03-18 PROCEDURE — 85730 THROMBOPLASTIN TIME PARTIAL: CPT

## 2021-03-18 PROCEDURE — 85025 COMPLETE CBC W/AUTO DIFF WBC: CPT

## 2021-03-18 PROCEDURE — 6360000002 HC RX W HCPCS: Performed by: PHYSICIAN ASSISTANT

## 2021-03-18 PROCEDURE — 2580000003 HC RX 258: Performed by: PHYSICIAN ASSISTANT

## 2021-03-18 PROCEDURE — 2500000003 HC RX 250 WO HCPCS: Performed by: INTERNAL MEDICINE

## 2021-03-18 PROCEDURE — 93010 ELECTROCARDIOGRAM REPORT: CPT | Performed by: INTERNAL MEDICINE

## 2021-03-18 PROCEDURE — 94761 N-INVAS EAR/PLS OXIMETRY MLT: CPT

## 2021-03-18 PROCEDURE — 82728 ASSAY OF FERRITIN: CPT

## 2021-03-18 RX ORDER — ACETAMINOPHEN 325 MG/1
650 TABLET ORAL EVERY 6 HOURS PRN
Status: DISCONTINUED | OUTPATIENT
Start: 2021-03-18 | End: 2021-03-18 | Stop reason: HOSPADM

## 2021-03-18 RX ORDER — DEXAMETHASONE 4 MG/1
6 TABLET ORAL DAILY
Status: DISCONTINUED | OUTPATIENT
Start: 2021-03-18 | End: 2021-03-18 | Stop reason: HOSPADM

## 2021-03-18 RX ORDER — ALBUTEROL SULFATE 90 UG/1
1 AEROSOL, METERED RESPIRATORY (INHALATION) EVERY 4 HOURS PRN
Status: DISCONTINUED | OUTPATIENT
Start: 2021-03-18 | End: 2021-03-18 | Stop reason: HOSPADM

## 2021-03-18 RX ORDER — BUPROPION HYDROCHLORIDE 150 MG/1
150 TABLET ORAL EVERY MORNING
Status: DISCONTINUED | OUTPATIENT
Start: 2021-03-18 | End: 2021-03-18 | Stop reason: HOSPADM

## 2021-03-18 RX ORDER — GUAIFENESIN/DEXTROMETHORPHAN 100-10MG/5
5 SYRUP ORAL EVERY 4 HOURS PRN
Status: DISCONTINUED | OUTPATIENT
Start: 2021-03-18 | End: 2021-03-18 | Stop reason: HOSPADM

## 2021-03-18 RX ORDER — POLYETHYLENE GLYCOL 3350 17 G/17G
17 POWDER, FOR SOLUTION ORAL DAILY PRN
Status: DISCONTINUED | OUTPATIENT
Start: 2021-03-18 | End: 2021-03-18 | Stop reason: HOSPADM

## 2021-03-18 RX ORDER — PROMETHAZINE HYDROCHLORIDE 25 MG/1
12.5 TABLET ORAL EVERY 6 HOURS PRN
Status: DISCONTINUED | OUTPATIENT
Start: 2021-03-18 | End: 2021-03-18 | Stop reason: HOSPADM

## 2021-03-18 RX ORDER — 0.9 % SODIUM CHLORIDE 0.9 %
30 INTRAVENOUS SOLUTION INTRAVENOUS PRN
Status: DISCONTINUED | OUTPATIENT
Start: 2021-03-18 | End: 2021-03-18 | Stop reason: HOSPADM

## 2021-03-18 RX ORDER — ONDANSETRON 2 MG/ML
4 INJECTION INTRAMUSCULAR; INTRAVENOUS EVERY 6 HOURS PRN
Status: DISCONTINUED | OUTPATIENT
Start: 2021-03-18 | End: 2021-03-18 | Stop reason: HOSPADM

## 2021-03-18 RX ORDER — DEXAMETHASONE SODIUM PHOSPHATE 4 MG/ML
6 INJECTION, SOLUTION INTRA-ARTICULAR; INTRALESIONAL; INTRAMUSCULAR; INTRAVENOUS; SOFT TISSUE ONCE
Status: COMPLETED | OUTPATIENT
Start: 2021-03-18 | End: 2021-03-18

## 2021-03-18 RX ORDER — VITAMIN B COMPLEX
2000 TABLET ORAL DAILY
Status: DISCONTINUED | OUTPATIENT
Start: 2021-03-18 | End: 2021-03-18 | Stop reason: HOSPADM

## 2021-03-18 RX ORDER — FAMOTIDINE 20 MG/1
20 TABLET, FILM COATED ORAL 2 TIMES DAILY
Status: DISCONTINUED | OUTPATIENT
Start: 2021-03-18 | End: 2021-03-18 | Stop reason: HOSPADM

## 2021-03-18 RX ORDER — REMDESIVIR 100 MG/1
INJECTION, POWDER, LYOPHILIZED, FOR SOLUTION INTRAVENOUS
Status: DISCONTINUED
Start: 2021-03-18 | End: 2021-03-18 | Stop reason: HOSPADM

## 2021-03-18 RX ORDER — GUAIFENESIN AND DEXTROMETHORPHAN HYDROBROMIDE 1200; 60 MG/1; MG/1
1 TABLET, EXTENDED RELEASE ORAL 2 TIMES DAILY
Qty: 20 TABLET | Refills: 0 | COMMUNITY
Start: 2021-03-18 | End: 2021-03-28

## 2021-03-18 RX ORDER — ACETAMINOPHEN 650 MG/1
650 SUPPOSITORY RECTAL EVERY 6 HOURS PRN
Status: DISCONTINUED | OUTPATIENT
Start: 2021-03-18 | End: 2021-03-18 | Stop reason: HOSPADM

## 2021-03-18 RX ORDER — SODIUM CHLORIDE 0.9 % (FLUSH) 0.9 %
10 SYRINGE (ML) INJECTION PRN
Status: DISCONTINUED | OUTPATIENT
Start: 2021-03-18 | End: 2021-03-18 | Stop reason: HOSPADM

## 2021-03-18 RX ORDER — SODIUM CHLORIDE 9 MG/ML
INJECTION, SOLUTION INTRAVENOUS CONTINUOUS
Status: DISCONTINUED | OUTPATIENT
Start: 2021-03-18 | End: 2021-03-18 | Stop reason: HOSPADM

## 2021-03-18 RX ORDER — SODIUM CHLORIDE 0.9 % (FLUSH) 0.9 %
10 SYRINGE (ML) INJECTION EVERY 12 HOURS SCHEDULED
Status: DISCONTINUED | OUTPATIENT
Start: 2021-03-18 | End: 2021-03-18 | Stop reason: HOSPADM

## 2021-03-18 RX ORDER — DEXAMETHASONE 6 MG/1
6 TABLET ORAL DAILY
Qty: 10 TABLET | Refills: 0 | Status: SHIPPED | OUTPATIENT
Start: 2021-03-19 | End: 2021-03-29

## 2021-03-18 RX ORDER — ATORVASTATIN CALCIUM 40 MG/1
40 TABLET, FILM COATED ORAL DAILY
Status: DISCONTINUED | OUTPATIENT
Start: 2021-03-18 | End: 2021-03-18 | Stop reason: HOSPADM

## 2021-03-18 RX ADMIN — SODIUM CHLORIDE: 9 INJECTION, SOLUTION INTRAVENOUS at 01:27

## 2021-03-18 RX ADMIN — FAMOTIDINE 20 MG: 20 TABLET ORAL at 01:43

## 2021-03-18 RX ADMIN — METFORMIN HYDROCHLORIDE 1000 MG: 500 TABLET ORAL at 08:03

## 2021-03-18 RX ADMIN — Medication 2000 UNITS: at 08:03

## 2021-03-18 RX ADMIN — ATORVASTATIN CALCIUM 40 MG: 40 TABLET, FILM COATED ORAL at 08:03

## 2021-03-18 RX ADMIN — DEXAMETHASONE SODIUM PHOSPHATE 6 MG: 4 INJECTION, SOLUTION INTRA-ARTICULAR; INTRALESIONAL; INTRAMUSCULAR; INTRAVENOUS; SOFT TISSUE at 01:43

## 2021-03-18 RX ADMIN — ENOXAPARIN SODIUM 40 MG: 40 INJECTION SUBCUTANEOUS at 01:44

## 2021-03-18 RX ADMIN — SODIUM CHLORIDE, PRESERVATIVE FREE 10 ML: 5 INJECTION INTRAVENOUS at 08:05

## 2021-03-18 RX ADMIN — REMDESIVIR 200 MG: 100 INJECTION, POWDER, LYOPHILIZED, FOR SOLUTION INTRAVENOUS at 02:53

## 2021-03-18 RX ADMIN — BUPROPION HYDROCHLORIDE 150 MG: 150 TABLET, EXTENDED RELEASE ORAL at 08:03

## 2021-03-18 RX ADMIN — FAMOTIDINE 20 MG: 20 TABLET ORAL at 08:03

## 2021-03-18 RX ADMIN — DEXAMETHASONE 6 MG: 4 TABLET ORAL at 08:03

## 2021-03-18 ASSESSMENT — PAIN SCALES - GENERAL
PAINLEVEL_OUTOF10: 4
PAINLEVEL_OUTOF10: 0

## 2021-03-18 ASSESSMENT — PAIN DESCRIPTION - LOCATION: LOCATION: CHEST

## 2021-03-18 ASSESSMENT — PAIN DESCRIPTION - PAIN TYPE: TYPE: ACUTE PAIN

## 2021-03-18 NOTE — H&P
[address] [facility]            HISTORY AND PHYSICAL     Pt Name: Bud Arizmendi  MRN: [id number]  [place] 1965  Date of evaluation: 3/17/2021  Provider: Nate Springer Dr       Chief Complaint   Patient presents with    Positive For Covid-19     03/15/2021, worsening symptoms. SpO2 92-95% at home       HISTORY OF PRESENT ILLNESS      History Obtained From:  patient  PCP: Lidia Mary MD    History of Present Illness: The patient is a 50 y.o. male who presents with complaints of worsening shortness of breath over the last 24 hours. Patient reports that about 4 days ago he began to not feel well. States he was seen in the ER and diagnosed with pneumonia. He reports he then followed up with primary care and tested positive for Covid. He states that over the last 24 hours his breathing has become worse and he feels more short of breath at rest and with exertion. He states that he was coughing all the time. He reports he is still febrile. He states that his brother was admitted with similar symptoms and he is concerned that at this point he needs to be admitted. Reports he is taking steroids as an outpatient that were prescribed to him but he is unsure if they are making much of a difference. He has no other complaints at this time denies any active chest pain or palpitations. Denies any diaphoresis syncopal episodes. Reports he is able to drink but has had a decreased appetite. Denies any abdominal pain.     Past Medical History:        Diagnosis Date    Asperger's disorder     Depression     Diabetes ([address] 2018    Hyperlipidemia     Vitamin D deficiency        Past Surgical History:        Procedure Laterality Date    COLECTOMY  2013    partial sigmoid colectomy due to diverticulitis    COLONOSCOPY  09/04/2019    Dr. Travon Fletcher of prior surg end to end anastomosis-sigmoid colon    COLONOSCOPY N/A 9/4/2019    COLORECTAL CANCER SCREENING, NOT HIGH RISK performed by Danilo Cortez MD at [address] [facility] [address]    UMBILICAL HERNIA REPAIR  2013       Medications Prior to Admission:    Prior to Admission medications    Medication Sig Start Date End Date Taking? Authorizing Provider   acetaminophen (TYLENOL) 325 MG tablet Take 325 mg by mouth every 6 hours as needed for Pain   Yes Historical Provider, MD   benzonatate (TESSALON PERLES) 100 MG capsule Take 1 capsule by mouth 3 times daily as needed for Cough 3/15/21  Yes Jared Segovia MD   predniSONE (DELTASONE) 10 MG tablet 3 tabs po BID x 3 days, then 2 tabs po BID x 3 days, then 1 tab po BID x 3 days, then 1 tab po QD until finished 3/15/21 3/25/21 Yes Jared Segovia MD   doxycycline hyclate (VIBRA-TABS) 100 MG tablet Take 1 tablet by mouth 2 times daily for 10 days 3/14/21 3/24/21 Yes [address], DO   albuterol sulfate HFA (VENTOLIN HFA) 108 (90 Base) MCG/ACT inhaler 1 to 2 puffs every 4 to 6 hours as needed shortness of breath or wheeze 3/14/21  Yes [address], DO   metFORMIN (GLUCOPHAGE) 1000 MG tablet Take 1 tablet by mouth 2 times daily (with meals) 9/3/20  Yes Jared Segovia MD   buPROPion (WELLBUTRIN XL) 150 MG extended release tablet Take 1 tablet by mouth every morning 9/3/20  Yes Jared Segovia MD   atorvastatin (LIPITOR) 40 MG tablet Take 1 tablet by mouth daily 9/3/20  Yes Jared Segovia MD       Allergies:  Patient has no known allergies. Social History:   TOBACCO:   reports that he has never smoked. He has never used smokeless tobacco.  ETOH:   reports previous alcohol use. Family History:       Problem Relation Age of Onset    Hypertension Mother     High Cholesterol Mother     Neuropathy Father         Shy-Drager syndrome       Review of Systems:  Review of Systems   Constitutional: Positive for fatigue and fever. Negative for chills and diaphoresis. HENT: Negative for congestion, ear pain, rhinorrhea and sore throat. Eyes: Negative for discharge, redness and visual disturbance. Respiratory: Positive for shortness of breath. Negative for cough, chest tightness and wheezing. Cardiovascular: Negative for chest pain and palpitations. Gastrointestinal: Negative for abdominal pain, blood in stool, constipation, diarrhea, nausea and vomiting. Endocrine: Negative for polydipsia, polyphagia and polyuria. Genitourinary: Negative for decreased urine volume, difficulty urinating, dysuria, frequency and hematuria. Musculoskeletal: Negative for arthralgias, back pain and myalgias. Skin: Negative for pallor and rash. Allergic/Immunologic: Negative for food allergies and immunocompromised state. Neurological: Negative for dizziness, syncope, weakness and light-headedness. Hematological: Negative for adenopathy. Does not bruise/bleed easily. Psychiatric/Behavioral: Negative for behavioral problems and suicidal ideas. The patient is not nervous/anxious. All other systems were reviewed with the patient and are negative except as stated    Objective:    Vitals:   Temp: 102.3 °F (39.1 °C)  BP: 136/79  Resp: 18  Pulse: 99  SpO2: 95 %  -----------------------------------------------------------------  Exam:  GEN: Well-developed well-nourished no obvious distress  HEENT: Pupils are equal round and reactive to light. Mucous membranes are moist.  Uvula is midline. No oral pharyngeal swelling or edema. NECK: Supple, no tenderness  CVS: Regular rate regular rhythm, no murmurs  PULM: Rhonchi noted bilaterally. ,  Tachypnea   ABD: Soft and nontender, nondistended  EXT: Moves all 4 extremities with ease. No significant swelling. No tenderness. NEURO: Patient is awake alert and oriented to person place time and situation. No focal neurologic deficit. SKIN: No rashes.   No skin lesions.    -----------------------------------------------------------------  Diagnostic Data:   · All diagnostic data was reviewed  Lab Results Component Value Date    WBC 8.7 03/17/2021    HGB 13.7 03/17/2021    MCV 82.9 03/17/2021     03/17/2021      Lab Results   Component Value Date    GLUCOSE 113 (H) 03/17/2021    BUN 22 (H) 03/17/2021    CREATININE 0.82 03/17/2021     03/17/2021    K 4.2 03/17/2021    CALCIUM 9.6 03/17/2021     03/17/2021    CO2 25 03/17/2021     No results found for: WBCUA, RBCUA, EPITHUA, LEUKOCYTESUR, SPECGRAV, GLUCOSEU, KETUA, PROTEINU, HGBUR, CASTUA, CRYSTUA, BACTERIA, YEAST    XR CHEST PORTABLE   Final Result   Increased multifocal airspace disease consistent with worsening pneumonia. Follow-up to resolution recommended. CT CHEST PULMONARY EMBOLISM W CONTRAST    (Results Pending)       EKG: Sinus rhythm without any acute ischemic changes noted. Assessment/ Plan:   56-year-old male recently testing positive for Covid 19. Was seen in the ER on Sunday with chest x-ray showing bilateral infiltrates screened by his primary care 2 days ago. Admitted for worsening pneumonia. 1.  COVID-19  2. Bilateral pneumonia  -Patient is admitted to [facility] 5 floor for further evaluation and treatment. Estimated length of stay is greater than 3 midnights. Placed on telemetry monitoring. He will be placed on enhanced droplet and contact isolation. Will consult pharmacy to order remdesivir. Will place patient on steroids. Prone positioning as tolerated. Patient very anxious and concern for pulmonary embolism. Very adamant on request for CT chest.  It has been ordered given his worsening shortness of breath is hypercoagulable state with COVID-19. CT chest is pending. Covid lab still pending at this time including ferritin, fibrinogen, D-dimer. 3.  Diabetes type 2  -Patient currently controlled. Was placed on prednisone in the outpatient setting blood sugar is only minimally elevated at 113. We will continue him on home medications.   We will monitor blood sugars closely as he will be on steroids. DVT prophylaxis:  Lovenox    GI prophylaxis:  Pepcid    Nutrition status:  Good    CODE STATUS:  Full    This is a 50 y.o. male admitted for COVID-19. This patient requires an inpatient admission as expected length of stay is greater than 3 midnights. Reviewed old charts and EKG. Consult made to case management. Advance directive discussed. Discussed all this with the patient who is in agreements.

## 2021-03-18 NOTE — PROGRESS NOTES
Georgie Head M.D. Internal Medicine Progress Note     SUBJECTIVE:    Patient seen for f/u of Covid-19. He slept well last night and feels a lot better this morning. He still has a dry, non-productive cough. Denies any SOB, CP or palpitations. Denies fever or chills. Tolerating diet without n/v/d. He is asking to go home since he is feeling so much better. ROS:   Constitutional: negative  for fevers, and negative for chills. Respiratory: negative for shortness of breath, negative for cough, and negative for wheezing  Cardiovascular: negative for chest pain, and negative for palpitations  Gastrointestinal: negative for abdominal pain, negative for nausea,negative for vomiting, negative for diarrhea, and negative for constipation     All other systems were reviewed with the patient and are negative unless otherwise stated in HPI    OBJECTIVE:  Vitals:   Temp: 96.9 °F (36.1 °C)  BP: 112/88  Resp: 18  Pulse: 80  SpO2: 96 % on room air    24HR INTAKE/OUTPUT:      Intake/Output Summary (Last 24 hours) at 3/18/2021 0903  Last data filed at 3/18/2021 0805  Gross per 24 hour   Intake 890.5 ml   Output --   Net 890.5 ml     -----------------------------------------------------------------  Exam:  GEN:    Awake, alert and oriented x3. EYES:  EOMI, pupils equal   NECK: Supple. No lymphadenopathy. No carotid bruit  CVS:    regular rate and rhythm, no audible murmur  PULM:  CTA, no wheezes, rales or rhonchi, no acute respiratory distress  ABD:    Bowels sounds normal.  Abdomen is soft. No distention. no tenderness to palpation. EXT:   no edema bilaterally . No calf tenderness. NEURO: Moves all extremities. Motor and sensory are grossly intact  SKIN:  No rashes.   No skin lesions.    -----------------------------------------------------------------  Diagnostic Data:    · All available data reviewed  Lab Results   Component Value Date    WBC 8.3 03/18/2021    HGB 13.0 03/18/2021    MCV 84.7 03/18/2021 PLT See Reflexed IPF Result 03/18/2021      Lab Results   Component Value Date    GLUCOSE 204 (H) 03/18/2021    BUN 23 (H) 03/18/2021    CREATININE 0.90 03/18/2021     (L) 03/18/2021    K 4.3 03/18/2021    CALCIUM 9.2 03/18/2021    CL 97 (L) 03/18/2021    CO2 26 03/18/2021       CT CHEST PULMONARY EMBOLISM W CONTRAST   Final Result   Commonly reported imaging features of COVID-19 pneumonia are present. Other   processes such as influenza pneumonia and organizing pneumonia, as can be   seen with drug toxicity and connective tissue disease, can cause a similar   imaging pattern. PneTyp         XR CHEST PORTABLE   Final Result   Increased multifocal airspace disease consistent with worsening pneumonia. Follow-up to resolution recommended.              ASSESSMENT / PLAN:  Covid-19  · Pt is not hypoxic and only symptoms at this time are fatigue and cough  · He would like to go home and self quarantine  · Will DC home with Dexamethasone 6 mg po QD x 10 days and Mucinex-DM bid  · High risk medications: none   · Disposition:  Discharge plan is home    Yoly Juares M.D.  3/18/2021  9:03 AM

## 2021-03-18 NOTE — PROGRESS NOTES
Pt requests warm blanket. Writer checked temperature first, noted to be 97.9 oral. [place] provided, pt denies any other needs at this time. Will continue to monitor.

## 2021-03-18 NOTE — DISCHARGE SUMMARY
Katy Bassett M.D. Internal Medicine Discharge Summary    Patient ID:  Saadia John  [id number]  1965    Admission date: 3/17/2021    Discharge date: 3/18/2021     Admitting Physician: Nahid Arellano MD     Primary Care Physician: Nahid Arellano MD     Primary Discharge Diagnoses:   Patient Active Problem List    Diagnosis Date Noted    COVID-19 03/17/2021    Hyperlipidemia     Asperger's disorder     Diabetes ([facility] [address] 01/01/2018       Additional Diagnoses:       Diagnosis Date    Asperger's disorder     Depression     Diabetes ([facility] 2018    Hyperlipidemia     Vitamin D deficiency         Hospital Course: The patient was admitted for fCovid-19 pneumonia without hypoxia. He was treated with IV steroids and nebulizer and improved dramatically overnight. This morning he is feeling much better and requesting to go home. He has not been hypoxic at all on room air and he has been afebrile since admission. Discharge Exam:  GEN:    Awake, alert and oriented x3. EYES:  EOMI, pupils equal   NECK: Supple. No lymphadenopathy. No carotid bruit  CVS:    regular rate and rhythm, no audible murmur  PULM:  CTA, no wheezes, rales or rhonchi, no acute respiratory distress  ABD:    Bowels sounds normal.  Abdomen is soft. No distention. no tenderness to palpation. EXT:   no edema bilaterally . No calf tenderness. NEURO: Moves all extremities. Motor and sensory are grossly intact  SKIN:  No rashes. No skin lesions.       Consultants:    · none    Procedures:    · none    Complications:   · none    Significant Diagnostic Studies:   · Discharge Labs:  Lab Results   Component Value Date    WBC 8.3 03/18/2021    HGB 13.0 03/18/2021    MCV 84.7 03/18/2021    PLT See Reflexed IPF Result 03/18/2021      Lab Results   Component Value Date    GLUCOSE 204 (H) 03/18/2021    BUN 23 (H) 03/18/2021    CREATININE 0.90 03/18/2021     (L) 03/18/2021    K 4.3 03/18/2021    CALCIUM 9.2 03/18/2021    CL 97 (L) 03/18/2021    CO2 26 03/18/2021       Discharge Condition:   · stable    Disposition:   · Discharge to Home    Discharge Medications:   Cely Levine   Home Medication Instructions [id number]    Printed on:03/18/21 0908   Medication Information                      acetaminophen (TYLENOL) 325 MG tablet  Take 325 mg by mouth every 6 hours as needed for Pain             albuterol sulfate HFA (VENTOLIN HFA) 108 (90 Base) MCG/ACT inhaler  1 to 2 puffs every 4 to 6 hours as needed shortness of breath or wheeze             atorvastatin (LIPITOR) 40 MG tablet  Take 1 tablet by mouth daily             benzonatate (TESSALON PERLES) 100 MG capsule  Take 1 capsule by mouth 3 times daily as needed for Cough             buPROPion (WELLBUTRIN XL) 150 MG extended release tablet  Take 1 tablet by mouth every morning             dexamethasone (DECADRON) 6 MG tablet  Take 1 tablet by mouth daily for 10 days             Dextromethorphan-guaiFENesin (MUCINEX DM MAXIMUM STRENGTH)  MG TB12  Take 1 tablet by mouth 2 times daily for 10 days             doxycycline hyclate (VIBRA-TABS) 100 MG tablet  Take 1 tablet by mouth 2 times daily for 10 days             metFORMIN (GLUCOPHAGE) 1000 MG tablet  Take 1 tablet by mouth 2 times daily (with meals)                 Patient Instructions:   · Activity: activity as tolerated  · Diet: regular diet  · Wound Care: none needed  · Follow up with Carmenza Krishnamurthy MD in 1-2 weeks     CORE MEASURES on Discharge (if applicable)  ACE/ARB in CHF: N/A  ASA in MI: N/A  Statin in MI: N/A  Statin in CVA: N/A  Antiplatelet in CVA: N/A    Total time spent on discharge services: 35 minutes  Including the following activities:  · Evaluation and Management of patient  · Discussion with patient and/or surrogate about current care plan  · Coordination with Case Management and/or   · Coordination of care with Consultants (if applicable)   · Coordination of care with Receiving Facility Physician (if applicable)  · Completion of DME forms (if applicable)  · Preparation of Discharge Summary  · Preparation of Medication Reconciliation  · Preparation of Discharge Prescriptions      Signed:  William Dorantes M.D.  3/18/2021  9:08 AM

## 2021-03-18 NOTE — PROGRESS NOTES
Remdesivir Initiation & Daily Monitoring    Physician requesting remdesivir (use limited to ID): Gume Muhammad per NP Deena Ortiz    Demonstrated benefit is to shorten the duration of illness, mortality benefit has not been shown. Seems to be most beneficial early in the disease course. Criteria for use (confirm all are met): yes  Suspected/Confirmed COVID-19 positive and requiring hospitalization  Patient requires supplemental oxygen (this is the population for whom remdesivir demonstrated a shortened duration of illness; benefit less clear for those on high flow oxygen or mechanical ventilation, but may be reasonable to consider if change in respiratory status was recent)  Not recommended to be used in patients with baseline ALT 5x ULN or more    There are no PK data available for severe renal impairment (eGFR < 30 mL/min) or on RRT. Consider risk/benefit for individual patient. Baseline CrCl: Estimated Creatinine Clearance: 101 mL/min (based on SCr of 0.82 mg/dL). Ensure LFTs are ordered at baseline & consider if monitoring during therapy is appropriate (this information is included in CMP or can be ordered separately). Package insert states: Perform hepatic laboratory testing in all patients before starting VEKLURY and while receiving VEKLURY as clinically appropriate. Baseline ALT: 36    Duration of therapy should be limited to 5 days. Anticipated stop date: 3/22/21    Also assess corticosteroid therapy: If patient has had symptoms for 7 days or more and is on supplemental oxygen, then a course of dexamethasone can also be considered. Dexamethasone indicated? If yes, is dexamethasone ordered? yes  Consider baseline CRP (steroids may be beneficial if > 20 mg/L and may be harmful if < 10 mg/L): n/a        Soraida Wyatt Pharm. D.    3/18/2021  1:59 AM

## 2021-03-18 NOTE — PROGRESS NOTES
AROM : WFL  Strength RLE  Comment: grossly 4+/5  Strength LLE  Comment: grossly 4+/5        Bed mobility  Supine to Sit: Independent  Transfers  Sit to Stand: Independent  Stand to sit:  Independent  Ambulation  Ambulation?: Yes  WB Status: unrestricted  Ambulation 1  Surface: level tile  Device: No Device  Assistance: Independent  Distance: 25 feet     Balance  Posture: Good  Sitting - Static: Good  Sitting - Dynamic: Good  Standing - Static: Good  Standing - Dynamic: Good        Plan   Plan  Times per week: 1 time visit for eval no further PT needed  Safety Devices  Type of devices: Call light within reach(nurse in room with pt)    AM-PAC Score     AM-PAC Inpatient Mobility without Stair Climbing Raw Score : 20 (03/18/21 0830)  AM-PAC Inpatient without Stair Climbing T-Scale Score : 60.57 (03/18/21 0830)  Mobility Inpatient CMS 0-100% Score: 0 (03/18/21 0830)  Mobility Inpatient without Stair CMS G-Code Modifier : CH (03/18/21 0830)       Goals          Therapy Time   Individual Concurrent Group Co-treatment   Time In 0741         Time Out 0751         Minutes 10                 Pinetta Jefferson, PT

## 2021-03-18 NOTE — PROGRESS NOTES
Occupational Therapy   Occupational Therapy Initial Assessment  Date: 3/18/2021   Patient Name: Adelaida Morel  MRN: [id number]     : 1965    Date of Service: 3/18/2021    Discharge Recommendations:  Home with assist PRN       Assessment   Assessment: Pt is a 49 y/o male admitted d/t COVID-19. Pt is currently independent with functional mobility, balance, and is able to perform ADLs safely and independently. No further need for OT services at this time. Treatment Diagnosis: generalized weakness  Prognosis: Good  Decision Making: Low Complexity  OT Education: OT Role;Plan of Care  Barriers to Learning: none  No Skilled OT: Independent with ADL's;At baseline function  REQUIRES OT FOLLOW UP: No  Activity Tolerance  Activity Tolerance: Patient Tolerated treatment well  Safety Devices  Safety Devices in place: Yes  Type of devices: Left in chair;Call light within reach;Nurse notified           Patient Diagnosis(es): The encounter diagnosis was COVID-19.     has a past medical history of Asperger's disorder, Depression, Diabetes ([facility] [address] Hyperlipidemia, and Vitamin D deficiency. has a past surgical history that includes Inguinal hernia repair (Left, ); colectomy (); Umbilical hernia repair (); Colonoscopy (2019); and Colonoscopy (N/A, 2019). Treatment Diagnosis: generalized weakness      Restrictions  Restrictions/Precautions  Restrictions/Precautions: General Precautions, Isolation    Subjective   General  Chart Reviewed: Yes  Patient assessed for rehabilitation services?: Yes  Family / Caregiver Present: No  Referring Practitioner: Shaila Garnica PA-C  Diagnosis: COVID-19  Subjective  Subjective: Pt reports 0/10 pain this date. States he has a cough and SOB  General Comment  Comments: Pt in bedside chair upon OT arrival. Agreeable to OT evaluation.   Patient Currently in Pain: Denies  Pain Assessment  Pain Assessment: 0-10  Pain Level: 0  Vital Signs  Temp: 96.9 °F (36.1 °C)  Temp Source: Temporal  Pulse: 80  Heart Rate Source: Apical  Resp: 18  BP: 112/88  BP Location: Right upper arm  MAP (mmHg): 96  Patient Position: Sitting;Up in chair  Level of Consciousness: Alert (0)  MEWS Score: 1  Patient Currently in Pain: Denies  Height and Weight  Height: 5' 4\" (162.6 cm)  Oxygen Therapy  SpO2: 96 %  Pulse Oximeter Device Mode: Continuous  Pulse Oximeter Device Location: Finger  O2 Device: None (Room air)  Social/Functional History  Social/Functional History  Lives With: Spouse  Type of Home: House  Home Layout: One level  ADL Assistance: Independent  Homemaking Assistance: Independent  Homemaking Responsibilities: Yes  Ambulation Assistance: Independent  Transfer Assistance: Independent  Active : Yes  Occupation: Full time employment       Objective   Vision: Impaired  Vision Exceptions: Wears glasses at all times  Hearing: Within functional limits    Orientation  Overall Orientation Status: Within Functional Limits     Balance  Sitting Balance: Independent  Standing Balance: Independent  Standing Balance  Time: 2 minutes  Activity: functional mobility  Comment: good dynamic standing balance, no LOB or safety concerns without AD  Functional Mobility  Functional - Mobility Device: No device  Activity: Other(within room)  Assist Level: Independent  Functional Mobility Comments: no LOB or safety concerns  ADL  Feeding: Independent  Grooming: Independent  UE Bathing: Independent  LE Bathing: Independent  UE Dressing: Independent  LE Dressing: Independent  Toileting: Independent           Transfers  Sit to stand: Independent  Stand to sit:  Independent     Cognition  Overall Cognitive Status: WFL                    LUE Strength  L Hand General: 4+/5  RUE Strength  R Hand General: 4+/5                   Plan        G-Code     OutComes Score                                                  AM-PAC Score        AM-PAC Inpatient Daily Activity Raw Score: 24 (03/18/21 6246)  AM-PAC Inpatient ADL T-Scale Score : 57.54 (03/18/21 0913)  ADL Inpatient CMS 0-100% Score: 0 (03/18/21 0913)  ADL Inpatient CMS G-Code Modifier : [facility] (03/18/21 0913)    Goals  Short term goals  Time Frame for Short term goals: 1 visit  Short term goal 1: Pt will be assessed for independence and safety with functional mobility and ADLs.        Therapy Time   Individual Concurrent Group Co-treatment   Time In [address]         Time Out 0903         Minutes [address]

## 2021-03-18 NOTE — ED NOTES
Patient called out c/o chills. VS checked. Temp of 102. 3. Dr Mikhail Zhao notified. See orders and MAR for administration.       Vikas Myers RN  03/17/21 2120

## 2021-03-18 NOTE — PROGRESS NOTES
Comprehensive Nutrition Assessment    Type and Reason for Visit:  Initial, Positive Nutrition Screen( MST 2)    Nutrition Recommendations/Plan:   1. Continue current diet. 2. Add 4 oz Glucerna TID with meals. Nutrition Assessment:  Mild malnutrition/Predicted suboptimal oral intakes r/t impaired respiratory dysfunction aeb Pt reported decreased PO prior to admission. Weight loss of 4.2% x 4 days. Pt on CC diet with A1c 6.4. Hx Asperger's, HLD, and vitamin D deficiency (on vitamin D). Glucose 204, on dexamethasone. Pt is at moderate nutrition risk. Malnutrition Assessment:  Malnutrition Status:  Mild malnutrition    Context:  Acute Illness     Findings of the 6 clinical characteristics of malnutrition:  Energy Intake:  Unable to assess  Weight Loss:  7 - Greater than 2% over 1 week(4.2%)     Body Fat Loss:  Unable to assess     Muscle Mass Loss:  Unable to assess    Fluid Accumulation:  No significant fluid accumulation     Strength:  Not Performed    Estimated Daily Nutrient Needs:  Energy (kcal):  8611-3144(09-18/OK); Weight Used for Energy Requirements:  Current     Protein (g):  77-89g(1.3-1.5g/kg); Weight Used for Protein Requirements:  Ideal        Fluid (ml/day):  1909 ml; Method Used for Fluid Requirements:  1 ml/kcal      Nutrition Related Findings:  unable to assess due to isolation with no window      Wounds:  None       Current Nutrition Therapies:    DIET CARB CONTROL; Anthropometric Measures:  · Height: 5' 4\" (162.6 cm)  · Current Body Weight: 182 lb 1.6 oz (82.6 kg)   · Admission Body Weight: 181 lb (82.1 kg)    · Usual Body Weight: 190 lb (86.2 kg)     · Ideal Body Weight: 130 lbs; % Ideal Body Weight 140.1 %   · BMI: 31.2  · BMI Categories: Obese Class 1 (BMI 30.0-34. 9)       Nutrition Diagnosis:   · Mild malnutrition, Predicted inadequate energy intake related to impaired respiratory function as evidenced by poor intake prior to admission, weight loss greater than or equal to 2% in 1 week      Nutrition Interventions:   Food and/or Nutrient Delivery:  Continue Current Diet, Start Oral Nutrition Supplement  Nutrition Education/Counseling:  Education not appropriate   Coordination of Nutrition Care:  No recommendation at this time    Goals:  PO > 75% of meals and supplements        Recent Labs     03/17/21  2140 03/18/21  0550    133*   K 4.2 4.3    97*   CO2 25 26   BUN 22* 23*   CREATININE 0.82 0.90   GLUCOSE 113* 204*   ALT 36  --    GFR                            Lab Results   Component Value Date    LABALBU 4.8 07/08/2019      Lab Results   Component Value Date    TRIG 132 03/13/2021    HDL 22 03/13/2021    LDLCALC 29 03/13/2021    LDLDIRECT 100 02/18/2020    LABVLDL 26 03/13/2021   No results for input(s): POCGLU in the last 72 hours.   Nutrition Monitoring and Evaluation:   Behavioral-Environmental Outcomes:  None Identified   Food/Nutrient Intake Outcomes:  Food and Nutrient Intake, Supplement Intake  Physical Signs/Symptoms Outcomes:  Biochemical Data, Weight     Discharge Planning:    Continue current diet, Continue Oral Nutrition Supplement     Electronically signed by Moreno Neumann RD, LD on 3/18/21 at 8:56 AM EDT    Contact: [phone]

## 2021-03-18 NOTE — PROGRESS NOTES
Pt arrived to floor from ED at [address] [facility], admitted by Dr. Carlos Donaldson for Dr. Jerrica Robert for [facility]. Pt ambulates to room without issue. Pt oriented to room and call light. VS obtained and assessment done as charted. Pt A&Ox4, answers questions appropriately. Temperature 98.1 noted to be improved since last checked in ED. SPO2 96% on room air. Pt states he has some intermittent chest discomfort at times, noted to be \"4\" on a 0-10 pain scale. Navigator completed. Telemetry in place. IVF started and medications administered as ordered. Writer contacted pharmacist to verify remdesivir appropriate for patient, then administered as ordered. Pt denies any questions or other needs at this time. Call light and bedside table within reach. Will continue to monitor.

## 2021-03-18 NOTE — PROGRESS NOTES
Patient assessed and vitals obtained at this time. Pt denies any pain this morning. Pt states he is feeling 100% better and inquires when he will be going home. SPO2 remains WDL on room air. Pt denies feeling SOB but does state he gets \"a little\" MILES. Pt educated on the importance of prone postioning when in bed and [facility]. Pt verbalizes understanding. Pt denies any further needs at this time.

## 2021-03-18 NOTE — ED NOTES
Dr Cherylene Picking called via answering service and connected with [facility]     Satnam Leos  03/17/21 2960

## 2021-03-18 NOTE — ED PROVIDER NOTES
[facility] ED  EMERGENCY DEPARTMENT ENCOUNTER      Pt Name: Korey Fine  MRN: [id number]  [place] 1965  Date of evaluation: 3/17/2021  Provider: Nate Barragan Dr     Chief Complaint   Patient presents with    Positive For Covid-19     03/15/2021, worsening symptoms. SpO2 92-95% at home         HISTORY OF PRESENT ILLNESS   (Location/Symptom, Timing/Onset, Context/Setting,Quality, Duration, Modifying Factors, Severity)  Note limiting factors. Korey Fine is a53 y.o. male who presents to the emergency department with complaints of worsening shortness of breath and cough over the past 4 days. Patient reports he was diagnosed with Covid 2 days ago and states that he just feels like he is getting worse and not better. He reports that he is able to monitor his oxygen at home and that it is dropping from what it was. He denies any history of underlying lung problems in the past but does report a history of diabetes and hyperlipidemia. He denies any syncopal episodes or chest pain. Reports body aches all over. Reports of brought him in today as a worsening shortness of breath and cough. He has no other complaints at this time. HPI    Nursing Notes werereviewed. REVIEW OF SYSTEMS    (2-9 systems for level 4, 10 or more for level 5)     Review of Systems   Constitutional: Negative for chills, diaphoresis and fever. HENT: Negative for congestion, ear pain, rhinorrhea and sore throat. Eyes: Negative for discharge, redness and visual disturbance. Respiratory: Positive for cough and shortness of breath. Negative for chest tightness and wheezing. Cardiovascular: Negative for chest pain and palpitations. Gastrointestinal: Negative for abdominal pain, blood in stool, constipation, diarrhea, nausea and vomiting. Endocrine: Negative for polydipsia, polyphagia and polyuria.    Genitourinary: Negative for decreased urine volume, difficulty urinating, dysuria, frequency and hematuria. Musculoskeletal: Negative for arthralgias, back pain and myalgias. Skin: Negative for pallor and rash. Allergic/Immunologic: Negative for food allergies and immunocompromised state. Neurological: Negative for dizziness, syncope, weakness and light-headedness. Hematological: Negative for adenopathy. Does not bruise/bleed easily. Psychiatric/Behavioral: Negative for behavioral problems and suicidal ideas. The patient is not nervous/anxious. Except as noted above the remainder of the review of systems was reviewed and negative.        PAST MEDICAL HISTORY     Past Medical History:   Diagnosis Date    Asperger's disorder     Depression     Diabetes ([facility] [address] 2018    Hyperlipidemia     Vitamin D deficiency          SURGICALHISTORY       Past Surgical History:   Procedure Laterality Date    COLECTOMY  2013    partial sigmoid colectomy due to diverticulitis    COLONOSCOPY  09/04/2019    Dr. Nika Means of prior surg end to end anastomosis-sigmoid colon    COLONOSCOPY N/A 9/4/2019    COLORECTAL CANCER SCREENING, NOT HIGH RISK performed by Brandan Son MD at [address] [place] [address] [id number]    UMBILICAL HERNIA REPAIR  2013         CURRENT MEDICATIONS       Previous Medications    ACETAMINOPHEN (TYLENOL) 325 MG TABLET    Take 325 mg by mouth every 6 hours as needed for Pain    ALBUTEROL SULFATE HFA (VENTOLIN HFA) 108 (90 BASE) MCG/ACT INHALER    1 to 2 puffs every 4 to 6 hours as needed shortness of breath or wheeze    ATORVASTATIN (LIPITOR) 40 MG TABLET    Take 1 tablet by mouth daily    BENZONATATE (TESSALON PERLES) 100 MG CAPSULE    Take 1 capsule by mouth 3 times daily as needed for Cough    BUPROPION (WELLBUTRIN XL) 150 MG EXTENDED RELEASE TABLET    Take 1 tablet by mouth every morning    DOXYCYCLINE HYCLATE (VIBRA-TABS) 100 MG TABLET    Take 1 tablet by mouth 2 times daily for 10 days    METFORMIN (GLUCOPHAGE) 1000 MG TABLET    Take 1 tablet by mouth 2 times daily (with meals)    PREDNISONE (DELTASONE) 10 MG TABLET    3 tabs po BID x 3 days, then 2 tabs po BID x 3 days, then 1 tab po BID x 3 days, then 1 tab po QD until finished         ALLERGIES   Patient has no known allergies.     FAMILY HISTORY       Family History   Problem Relation Age of Onset    Hypertension Mother     High Cholesterol Mother     Neuropathy Father         Shy-Drager syndrome          SOCIAL HISTORY       Social History     Socioeconomic History    Marital status:      Spouse name: None    Number of children: None    Years of education: None    Highest education level: None   Occupational History    None   Social Needs    Financial resource strain: Somewhat hard    Food insecurity     Worry: Never true     Inability: Never true    Transportation needs     Medical: No     Non-medical: No   Tobacco Use    Smoking status: Never Smoker    Smokeless tobacco: Never Used   Substance and Sexual Activity    Alcohol use: Not Currently    Drug use: Never    Sexual activity: None   Lifestyle    Physical activity     Days per week: None     Minutes per session: None    Stress: None   Relationships    Social connections     Talks on phone: None     Gets together: None     Attends Confucianism service: None     Active member of club or organization: None     Attends meetings of clubs or organizations: None     Relationship status: None    Intimate partner violence     Fear of current or ex partner: None     Emotionally abused: None     Physically abused: None     Forced sexual activity: None   Other Topics Concern    None   Social History Narrative    None       SCREENINGS             PHYSICAL EXAM    (up to 7 for level 4, 8 or more for level 5)     ED Triage Vitals [03/17/21 2015]   BP Temp Temp Source Pulse Resp SpO2 Height Weight   (!) 159/81 98.9 °F (37.2 °C) Temporal 99 18 95 % 5' 4\" (1.626 m) 185 lb (83.9 kg)       Physical Exam  Vitals signs and nursing note reviewed. Constitutional:       General: He is not in acute distress. Appearance: Normal appearance. He is well-developed. He is not ill-appearing, toxic-appearing or diaphoretic. HENT:      Head: Normocephalic and atraumatic. Right Ear: External ear normal.      Left Ear: External ear normal.      Mouth/Throat:      Pharynx: No oropharyngeal exudate. Eyes:      General: No scleral icterus. Right eye: No discharge. Left eye: No discharge. Conjunctiva/sclera: Conjunctivae normal.      Pupils: Pupils are equal, round, and reactive to light. Neck:      Musculoskeletal: Normal range of motion and neck supple. Thyroid: No thyromegaly. Trachea: No tracheal deviation. Cardiovascular:      Rate and Rhythm: Normal rate and regular rhythm. Heart sounds: No murmur. No friction rub. No gallop. Pulmonary:      Effort: Pulmonary effort is normal. Tachypnea present. No respiratory distress. Breath sounds: No stridor, decreased air movement or transmitted upper airway sounds. Rhonchi present. No wheezing. Abdominal:      General: Bowel sounds are normal. There is no distension. Palpations: Abdomen is soft. Tenderness: There is no abdominal tenderness. There is no right CVA tenderness, left CVA tenderness, guarding or rebound. Musculoskeletal: Normal range of motion. General: No tenderness or deformity. Lymphadenopathy:      Cervical: No cervical adenopathy. Skin:     General: Skin is warm and dry. Capillary Refill: Capillary refill takes less than 2 seconds. Findings: No erythema or rash. Neurological:      General: No focal deficit present. Mental Status: He is alert and oriented to person, place, and time. Cranial Nerves: No cranial nerve deficit. Motor: No abnormal muscle tone. Deep Tendon Reflexes: Reflexes are normal and symmetric.  Reflexes normal.   Psychiatric:         Mood and Affect: Mood normal. Behavior: Behavior normal.         DIAGNOSTIC RESULTS     EKG: All EKG's are interpreted by the Emergency Department Physician who either signs orCo-signs this chart in the absence of a cardiologist.      RADIOLOGY:   Non-plainfilm images such as CT, Ultrasound and MRI are read by the radiologist. Plain radiographic images are visualized and preliminarily interpreted by the emergency physician with the below findings:      Interpretationper the Radiologist below, if available at the time of this note:    XR CHEST PORTABLE   Final Result   Increased multifocal airspace disease consistent with worsening pneumonia. Follow-up to resolution recommended. CT CHEST PULMONARY EMBOLISM W CONTRAST    (Results Pending)         ED BEDSIDE ULTRASOUND:   Performed by ED Physician - none    LABS:  Labs Reviewed   BASIC METABOLIC PANEL - Abnormal; Notable for the following components:       Result Value    Glucose 113 (*)     BUN 22 (*)     Bun/Cre Ratio 27 (*)     All other components within normal limits   BLOOD GAS, VENOUS - Abnormal; Notable for the following components:    pH, Car 7.490 (*)     pCO2, Car 32.8 (*)     pO2, Car 136.0 (*)     O2 Sat, Car 98.9 (*)     All other components within normal limits   CBC       All other labs were within normal range or not returned as of this dictation. EMERGENCY DEPARTMENT COURSE and DIFFERENTIAL DIAGNOSIS/MDM:   Vitals:    Vitals:    03/17/21 2115 03/17/21 2125 03/17/21 2127 03/17/21 2130   BP: 131/76   136/79   Pulse:       Resp:       Temp:   102.3 °F (39.1 °C)    TempSrc:   Oral    SpO2:  93%  95%   Weight:       Height:             MDM  37-year-old male who was diagnosed with Covid over the weekend presents with worsening shortness of breath. On exam he is satting 93% on room air he is tachypneic he is afebrile not tachycardic. Diffuse rhonchi. At this point will get work-up to include rule out of worsening pneumonia, will order venous blood gas.   We will check electrolytes he is a diabetic is been on steroids. At this point he has worsening pneumonia seen on chest x-ray things likely causing his shortness of breath. Given the significant worsening in his clinical symptoms I do think he would benefit from hospitalization. I discussed with patient who agrees. Patient then reports to me that he is needs a CT chest.  He states his brother got 1 and that is what he wants. He states that he needs to ensure he does not have a blood clot. I explained to him that overall I think that the chest x-ray showing worsening infiltrates is likely indicative of what is causing his worsening shortness of breath but given the hypercoagulable state in COVID-19 patients we will add a CT chest.    I spoke with Dr. Edu Mcmanus who will accept the patient for Dr. Bridgette Corrigan for Covid. Procedures    FINAL IMPRESSION      1. COVID-19        DISPOSITION/PLAN   DISPOSITION Admitted 03/17/2021 10:43:30 PM      PATIENT REFERRED TO:  No follow-up provider specified. DISCHARGE MEDICATIONS:  New Prescriptions    No medications on file              Summation      Patient Course:      ED Medications administered this visit:    Medications   acetaminophen (TYLENOL) tablet 1,000 mg (1,000 mg Oral Given 3/17/21 2133)   ibuprofen (ADVIL;MOTRIN) tablet 800 mg (800 mg Oral Given 3/17/21 2133)       New Prescriptions from this visit:    New Prescriptions    No medications on file       Follow-up:  No follow-up provider specified. Final Impression:   1.  COVID-19               (Please note that portions of this note were completed with a voice recognition program.  Efforts were made to edit the dictations but occasionally words are mis-transcribed.)           Arturo Charles PA-C  03/17/21 1449

## 2021-03-19 ENCOUNTER — CARE COORDINATION (OUTPATIENT)
Dept: CASE MANAGEMENT | Age: 56
End: 2021-03-19

## 2021-03-19 NOTE — CARE COORDINATION
[facility] Trans[facility] Initial Follow Up Call- COVID risk follow up call       Call within 2 business days of discharge: Yes    Patient: Moo Evans Patient : 1965   MRN: <[id number]>  Reason for Admission: COVID-19    Discharge Date: 3/18/21 RARS: Readmission Risk Score: 12      Last Discharge [facility]       Complaint Diagnosis Description Type Department Provider    3/17/21 Positive For Covid-19 COVID-19 ED to Hosp-Admission (Discharged) (Luis Cardona) Sadia Morel MD           Spoke with: Laurel Him     Patient returned call. States he is miserable, coughing a lot and is very tired- states understanding his energy will come back with time. Writer advised resting when needed but to have activity a few times a day for 5-10 minutes. States he took a mucinex earlier. Writer reminded him he also has the benzonatate he can take 3 times a day if needed   States he has acapella device as well and will use it throughout the day   States breathing is better and SpO2 while on the call was 95%. States understanding he can use the albuterol inhaler 1-2 puffs every 4-6 hours  States not much appetite but states understanding to get some meals in throughout the day. States drinking plenty of fluids   Confirms isolation through 3/30 (per AVS)     Patient contacted regarding COVID-19 diagnosis\". Discussed COVID-19 related testing which was available at this time. Test results were positive. Patient informed of results, if available? Yes    Care Transition Nurse contacted the patient by telephone to perform post discharge assessment. Call within 2 business days of discharge: Yes. Verified name and  with patient as identifiers. Provided introduction to self, and explanation of the CTN/ACM role, and reason for call due to risk factors for infection and/or exposure to COVID-19. Symptoms reviewed with patient who verbalized the following symptoms: cough and sweating.       Due to no new or worsening symptoms encounter was not routed to provider for escalation. Discussed follow-up appointments. If no appointment was previously scheduled, appointment scheduling offered: Yes  [facility] follow up appointment(s):   Future Appointments   Date Time Provider Samy Turneri   9/20/2021  4:30 PM MD Shanti Oliver C.D. Gege Perdue     Non-[facility] follow up appointment(s): n/a     Non-face-to-face services provided:  Scheduled appointment with PCP-pt states he will call to schedule. Writer sent staff message to Saurav Figueroa CT  to assist with appt  Obtained and reviewed discharge summary and/or continuity of care documents  Assessment and support for treatment adherence and medication management-confirms new meds and meds on AVS      Advance Care Planning:   Does patient have an Advance Directive:  not on file; education provided. Patient has following risk factors of: pneumonia and covid . CTN reviewed discharge instructions, medical action plan and red flags such as increased shortness of breath, increasing fever and signs of decompensation with patient who verbalized understanding. Discussed exposure protocols and quarantine with CDC Guidelines What to do if you are sick with coronavirus disease 2019.  Patient was given an opportunity for questions and concerns. The patient agrees to contact the [facility] exposure line [phone], local [facility] department [place] [facility]: ([phone]) and PCP office for questions related to their healthcare. CTN provided contact information for future needs. Reviewed and educated patient on any new and changed medications related to discharge diagnosis     Was patient discharged with a pulse oximeter? Yes Discussed and confirmed pulse oximeter discharge instructions and when to notify provider or seek emergency care.      Patient/family/caregiver given information for [employer] and agrees to enroll yes  Patient's preferred e-mail: [email or web address]    Patient's preferred phone number: [phone]  Based on Loop alert triggers, patient will be contacted by nurse care manager for worsening symptoms. will be followed by [facility]  based on severity of symptoms and risk factors. Care Transitions 24 Hour Call    Do you have any ongoing symptoms?: No  Do you have a copy of your discharge instructions?: Yes  Do you have all of your prescriptions and are they filled?: Yes  Have you been contacted by a [address]?: No  Have you scheduled your follow up appointment?: No  Were you discharged with any Home Care or Post Acute Services: No  Do you feel like you have everything you need to keep you well at home?: Yes  Care Transitions Interventions         Follow Up  Future Appointments   Date Time Provider Samy Vaughn   9/20/2021  4:30 PM MD Eloisa Baxter C.WARD Jones RN

## 2021-03-19 NOTE — CARE COORDINATION
Attempted to make appt for patient, PCP office is not open at this time. Will send msg back to CTN as well as Sandy Cummings,  to help patient on Monday. Patient is aware and agrees, patient also states it is ok to leave msg with date and time of appt.     Rosetta Armstrong, [address] [facility] Coordination Transition

## 2021-03-19 NOTE — CARE COORDINATION
[facility] Trans[facility] Initial Follow Up Call- 1st attempt COVID risk follow up call       Call within 2 business days of discharge: Yes    Patient: Phillip Cabrera Patient : 1965   MRN: <[id number]>  Reason for Admission: COVID-19   Discharge Date: 3/18/21 RARS: Readmission Risk Score: 12      Last Discharge [address] [address]       Complaint Diagnosis Description Type Department Provider    3/17/21 Positive For Covid-19 COVID-19 ED to Hosp-Admission (Discharged) (Sabrina Salomon) Stephen Vciente MD           Spoke with: [facility]    Date/Time:  3/19/2021 11:45 AM  Attempted to reach patient by telephone. Call within 2 business days of discharge: Yes Left HIPPA compliant message requesting a return call. Will attempt to reach patient again. Non-face-to-face services provided:  Assessment and support for treatment adherence and medication management-call to pharmacy who confirms meds picked up     Care Transitions 24 Hour Call    Care Transitions Interventions         Follow Up  Future Appointments   Date Time Provider Samy Vaughn   2021  4:30 PM MD Byron Villegas C.D., RN

## (undated) DEVICE — SOLUTION IV IRRIG POUR BRL 0.9% SODIUM CHL 2F7124

## (undated) DEVICE — CANNULA ORAL NSL AD CO2 N INTUB O2 DEL DISP TRU LNK

## (undated) DEVICE — MEDI-VAC NON-CONDUCTIVE TUBING7MM X 30.5 (100FT): Brand: CARDINAL HEALTH